# Patient Record
Sex: MALE | Race: WHITE | NOT HISPANIC OR LATINO | Employment: OTHER | URBAN - METROPOLITAN AREA
[De-identification: names, ages, dates, MRNs, and addresses within clinical notes are randomized per-mention and may not be internally consistent; named-entity substitution may affect disease eponyms.]

---

## 2023-05-01 DIAGNOSIS — N40.1 BENIGN PROSTATIC HYPERPLASIA WITH LOWER URINARY TRACT SYMPTOMS, SYMPTOM DETAILS UNSPECIFIED: Primary | ICD-10-CM

## 2023-05-01 RX ORDER — TERAZOSIN 5 MG/1
5 CAPSULE ORAL DAILY
COMMUNITY
End: 2023-05-01 | Stop reason: SDUPTHER

## 2023-05-01 RX ORDER — TERAZOSIN 5 MG/1
5 CAPSULE ORAL DAILY
Qty: 90 CAPSULE | Refills: 1 | Status: SHIPPED | OUTPATIENT
Start: 2023-05-01 | End: 2024-01-02

## 2023-05-04 DIAGNOSIS — E11.42 DIABETIC POLYNEUROPATHY ASSOCIATED WITH TYPE 2 DIABETES MELLITUS (MULTI): ICD-10-CM

## 2023-05-04 PROBLEM — R79.89 ABNORMAL TSH: Status: ACTIVE | Noted: 2023-05-04

## 2023-05-04 PROBLEM — E11.9 DIABETES MELLITUS TYPE 2, CONTROLLED (MULTI): Status: ACTIVE | Noted: 2023-05-04

## 2023-05-04 PROBLEM — M25.562 ARTHRALGIA OF LEFT KNEE: Status: ACTIVE | Noted: 2023-05-04

## 2023-05-04 PROBLEM — N52.9 ERECTILE DYSFUNCTION: Status: ACTIVE | Noted: 2023-05-04

## 2023-05-04 PROBLEM — M47.812 FACET ARTHRITIS OF CERVICAL REGION: Status: ACTIVE | Noted: 2023-05-04

## 2023-05-04 PROBLEM — I25.10 ARTERIOSCLEROTIC CARDIOVASCULAR DISEASE: Status: ACTIVE | Noted: 2023-05-04

## 2023-05-04 PROBLEM — M19.90 ARTHRITIS: Status: ACTIVE | Noted: 2023-05-04

## 2023-05-04 PROBLEM — N28.89 KIDNEY MASS: Status: ACTIVE | Noted: 2023-05-04

## 2023-05-04 PROBLEM — R97.20 HIGH PROSTATE SPECIFIC ANTIGEN (PSA): Status: ACTIVE | Noted: 2023-05-04

## 2023-05-04 PROBLEM — M54.9 BACK PAIN: Status: ACTIVE | Noted: 2023-05-04

## 2023-05-04 PROBLEM — D64.9 ANEMIA: Status: ACTIVE | Noted: 2023-05-04

## 2023-05-04 PROBLEM — N18.32 STAGE 3B CHRONIC KIDNEY DISEASE (MULTI): Status: ACTIVE | Noted: 2023-05-04

## 2023-05-04 PROBLEM — K57.90 DIVERTICULOSIS: Status: ACTIVE | Noted: 2023-05-04

## 2023-05-04 PROBLEM — I10 HYPERTENSION: Status: ACTIVE | Noted: 2023-05-04

## 2023-05-04 PROBLEM — N18.9 CHRONIC KIDNEY DISEASE: Status: ACTIVE | Noted: 2023-05-04

## 2023-05-04 PROBLEM — M47.816 LUMBAR SPONDYLOSIS: Status: ACTIVE | Noted: 2023-05-04

## 2023-05-04 PROBLEM — I45.10 COMPLETE RIGHT BUNDLE BRANCH BLOCK: Status: ACTIVE | Noted: 2023-05-04

## 2023-05-04 PROBLEM — R79.89 LOW VITAMIN D LEVEL: Status: ACTIVE | Noted: 2023-05-04

## 2023-05-04 PROBLEM — R93.89 ABNORMAL FINDING ON IMAGING: Status: ACTIVE | Noted: 2023-05-04

## 2023-05-04 PROBLEM — N40.0 BPH (BENIGN PROSTATIC HYPERPLASIA): Status: ACTIVE | Noted: 2023-05-04

## 2023-05-04 PROBLEM — M43.16 SPONDYLOLISTHESIS, LUMBAR REGION: Status: ACTIVE | Noted: 2023-05-04

## 2023-05-04 PROBLEM — E78.5 HYPERLIPIDEMIA: Status: ACTIVE | Noted: 2023-05-04

## 2023-05-04 PROBLEM — M47.816 FACET DEGENERATION OF LUMBAR REGION: Status: ACTIVE | Noted: 2023-05-04

## 2023-05-04 PROBLEM — R00.1 BRADYCARDIA, SINUS: Status: ACTIVE | Noted: 2023-05-04

## 2023-05-04 PROBLEM — M17.9 OSTEOARTHRITIS OF KNEE: Status: ACTIVE | Noted: 2023-05-04

## 2023-05-04 PROBLEM — I65.23 CALCIFICATION OF BOTH CAROTID ARTERIES: Status: ACTIVE | Noted: 2023-05-04

## 2023-05-04 PROBLEM — M50.90 CERVICAL DISC DISEASE: Status: ACTIVE | Noted: 2023-05-04

## 2023-05-04 PROBLEM — M48.062 SPINAL STENOSIS, LUMBAR REGION, WITH NEUROGENIC CLAUDICATION: Status: ACTIVE | Noted: 2023-05-04

## 2023-05-04 RX ORDER — GABAPENTIN 300 MG/1
300 CAPSULE ORAL 3 TIMES DAILY
Qty: 270 CAPSULE | Refills: 1 | Status: SHIPPED | OUTPATIENT
Start: 2023-05-04 | End: 2024-01-29

## 2023-05-04 RX ORDER — GABAPENTIN 300 MG/1
300 CAPSULE ORAL 3 TIMES DAILY
COMMUNITY
End: 2023-05-04 | Stop reason: SDUPTHER

## 2023-05-05 DIAGNOSIS — E11.42 DIABETIC POLYNEUROPATHY ASSOCIATED WITH TYPE 2 DIABETES MELLITUS (MULTI): ICD-10-CM

## 2023-05-06 RX ORDER — GABAPENTIN 300 MG/1
300 CAPSULE ORAL 3 TIMES DAILY
Qty: 270 CAPSULE | Refills: 1 | OUTPATIENT
Start: 2023-05-06

## 2023-05-09 RX ORDER — VIT C/E/ZN/COPPR/LUTEIN/ZEAXAN 250MG-90MG
CAPSULE ORAL
COMMUNITY

## 2023-05-09 RX ORDER — ASPIRIN 81 MG/1
1 TABLET ORAL DAILY
COMMUNITY

## 2023-05-09 RX ORDER — BLOOD SUGAR DIAGNOSTIC
1 STRIP MISCELLANEOUS DAILY
COMMUNITY
End: 2023-12-13 | Stop reason: WASHOUT

## 2023-05-09 RX ORDER — LOSARTAN POTASSIUM 25 MG/1
25 TABLET ORAL DAILY
COMMUNITY
End: 2023-05-16 | Stop reason: SDUPTHER

## 2023-05-09 RX ORDER — AMMONIUM LACTATE 12 G/100G
LOTION TOPICAL
COMMUNITY
Start: 2022-02-09 | End: 2023-09-12 | Stop reason: WASHOUT

## 2023-05-09 RX ORDER — SIMVASTATIN 40 MG/1
40 TABLET, FILM COATED ORAL DAILY
COMMUNITY
End: 2023-07-25

## 2023-05-09 RX ORDER — GLIPIZIDE 2.5 MG/1
TABLET, EXTENDED RELEASE ORAL
COMMUNITY
End: 2023-05-16 | Stop reason: ALTCHOICE

## 2023-05-16 ENCOUNTER — OFFICE VISIT (OUTPATIENT)
Dept: PRIMARY CARE | Facility: CLINIC | Age: 88
End: 2023-05-16
Payer: MEDICARE

## 2023-05-16 VITALS
DIASTOLIC BLOOD PRESSURE: 62 MMHG | WEIGHT: 179 LBS | RESPIRATION RATE: 21 BRPM | SYSTOLIC BLOOD PRESSURE: 150 MMHG | HEART RATE: 56 BPM | BODY MASS INDEX: 30.73 KG/M2

## 2023-05-16 DIAGNOSIS — N18.32 ANEMIA DUE TO STAGE 3B CHRONIC KIDNEY DISEASE (MULTI): ICD-10-CM

## 2023-05-16 DIAGNOSIS — M48.062 SPINAL STENOSIS, LUMBAR REGION, WITH NEUROGENIC CLAUDICATION: ICD-10-CM

## 2023-05-16 DIAGNOSIS — R79.89 LOW VITAMIN D LEVEL: ICD-10-CM

## 2023-05-16 DIAGNOSIS — N40.0 BENIGN PROSTATIC HYPERPLASIA WITHOUT LOWER URINARY TRACT SYMPTOMS: ICD-10-CM

## 2023-05-16 DIAGNOSIS — E11.42 DIABETIC POLYNEUROPATHY ASSOCIATED WITH TYPE 2 DIABETES MELLITUS (MULTI): ICD-10-CM

## 2023-05-16 DIAGNOSIS — N28.89 KIDNEY MASS: ICD-10-CM

## 2023-05-16 DIAGNOSIS — D63.1 ANEMIA DUE TO STAGE 3B CHRONIC KIDNEY DISEASE (MULTI): ICD-10-CM

## 2023-05-16 DIAGNOSIS — I10 PRIMARY HYPERTENSION: Primary | ICD-10-CM

## 2023-05-16 PROCEDURE — 1160F RVW MEDS BY RX/DR IN RCRD: CPT | Performed by: INTERNAL MEDICINE

## 2023-05-16 PROCEDURE — 1159F MED LIST DOCD IN RCRD: CPT | Performed by: INTERNAL MEDICINE

## 2023-05-16 PROCEDURE — 1036F TOBACCO NON-USER: CPT | Performed by: INTERNAL MEDICINE

## 2023-05-16 PROCEDURE — 3077F SYST BP >= 140 MM HG: CPT | Performed by: INTERNAL MEDICINE

## 2023-05-16 PROCEDURE — 1157F ADVNC CARE PLAN IN RCRD: CPT | Performed by: INTERNAL MEDICINE

## 2023-05-16 PROCEDURE — 99214 OFFICE O/P EST MOD 30 MIN: CPT | Performed by: INTERNAL MEDICINE

## 2023-05-16 PROCEDURE — 85027 COMPLETE CBC AUTOMATED: CPT

## 2023-05-16 PROCEDURE — 80053 COMPREHEN METABOLIC PANEL: CPT

## 2023-05-16 PROCEDURE — 3078F DIAST BP <80 MM HG: CPT | Performed by: INTERNAL MEDICINE

## 2023-05-16 PROCEDURE — 83036 HEMOGLOBIN GLYCOSYLATED A1C: CPT

## 2023-05-16 RX ORDER — LOSARTAN POTASSIUM 25 MG/1
50 TABLET ORAL DAILY
Qty: 180 TABLET | Refills: 1 | Status: SHIPPED | OUTPATIENT
Start: 2023-05-16 | End: 2023-09-12 | Stop reason: SDUPTHER

## 2023-05-16 ASSESSMENT — ENCOUNTER SYMPTOMS
SLEEP DISTURBANCE: 0
CHILLS: 0
CONFUSION: 0
CONSTIPATION: 0
LIGHT-HEADEDNESS: 1
BACK PAIN: 1
FATIGUE: 1
VOMITING: 0
DYSPHORIC MOOD: 0
CHEST TIGHTNESS: 0
ABDOMINAL PAIN: 0
NERVOUS/ANXIOUS: 0
FEVER: 0
WHEEZING: 0
DIZZINESS: 0
SHORTNESS OF BREATH: 0
DECREASED CONCENTRATION: 0
COUGH: 0
ACTIVITY CHANGE: 0
HEADACHES: 0
APPETITE CHANGE: 0
PALPITATIONS: 0
NAUSEA: 0
STRIDOR: 0
DIFFICULTY URINATING: 0
UNEXPECTED WEIGHT CHANGE: 0
DIARRHEA: 0

## 2023-05-16 NOTE — PROGRESS NOTES
Subjective   Patient ID: Nik Washington is a 88 y.o. male who presents for routine follow up.    HPI  Pt here in 3 month follow up.  He tells me in the last few months he has noticed his BP going up a bit.  He will get 145 as systolic reading.  He has low diastolic readings.  He is on Losartan 25 mg daily.      He last had a1c of 6.4 in July and 6.7% in 12/2022.  He is currently not on any DM meds.      He continues on statin therapy.      He sees nephrology regularly.  Has his appointment June.  He has history of stage 3 CKD with anemia.  He has history of masses on his kidney's that we are just watching.      Review of Systems   Constitutional:  Positive for fatigue. Negative for activity change, appetite change, chills, fever and unexpected weight change.   Respiratory:  Negative for cough, chest tightness, shortness of breath, wheezing and stridor.    Cardiovascular:  Negative for chest pain, palpitations and leg swelling.   Gastrointestinal:  Negative for abdominal pain, constipation, diarrhea, nausea and vomiting.   Genitourinary:  Negative for difficulty urinating.   Musculoskeletal:  Positive for back pain.   Neurological:  Positive for light-headedness. Negative for dizziness and headaches.   Psychiatric/Behavioral:  Negative for confusion, decreased concentration, dysphoric mood and sleep disturbance. The patient is not nervous/anxious.        Objective   /62   Pulse 56   Resp 21   Wt 81.2 kg (179 lb)   BMI 30.73 kg/m²    Physical Exam  Constitutional:       Appearance: Normal appearance.   Cardiovascular:      Rate and Rhythm: Normal rate and regular rhythm.      Pulses: Normal pulses.      Heart sounds: Normal heart sounds.   Pulmonary:      Effort: Pulmonary effort is normal.      Breath sounds: Normal breath sounds.   Abdominal:      General: Bowel sounds are normal.      Palpations: Abdomen is soft.   Musculoskeletal:      Right lower leg: Edema (1+ pitting edema mid way up shin  bilaterally) present.      Left lower leg: Edema present.   Neurological:      Mental Status: He is alert and oriented to person, place, and time.   Psychiatric:         Mood and Affect: Mood normal.         Assessment/Plan   Problem List Items Addressed This Visit          Nervous    Diabetic polyneuropathy associated with type 2 diabetes mellitus (CMS/HCC)     On Gabapentin  His A1C is <7% so we took him off Glipizide last year  Repeat levels today   Low sugar/carb diet          Relevant Orders    Hemoglobin A1C    Follow Up In Primary Care       Circulatory    Hypertension - Primary     Pt seeing higher readings at home  Will increase his dose to 25 mg twice a day and instructed pt to take 2nd dose only if his BP is high->140/90   Watch diet-low in salt          Relevant Medications    losartan (Cozaar) 25 mg tablet    Other Relevant Orders    Comprehensive Metabolic Panel    Follow Up In Primary Care       Genitourinary    BPH (benign prostatic hyperplasia)    Relevant Orders    Follow Up In Primary Care    Kidney mass     We are just watching this; likely cancer but with patient age and other conditions observation safest approach         Relevant Orders    Follow Up In Primary Care    Anemia due to stage 3b chronic kidney disease     Repeating labs today  This is due to CKD          Relevant Orders    CBC    Follow Up In Primary Care       Musculoskeletal    Spinal stenosis, lumbar region, with neurogenic claudication    Relevant Orders    Follow Up In Primary Care       Other    Low vitamin D level    Relevant Orders    Follow Up In Primary Care

## 2023-05-16 NOTE — ASSESSMENT & PLAN NOTE
Pt seeing higher readings at home  Will increase his dose to 25 mg twice a day and instructed pt to take 2nd dose only if his BP is high->140/90   Watch diet-low in salt

## 2023-05-16 NOTE — PATIENT INSTRUCTIONS
Increase dose of Losartan which is your blood pressure medication to 50 mg daily (take 2 of your 25 mg tablets)   You can either separate the 2 doses by taking 1 in AM and 1 in PM (if your BP in the late afternoon is not high you do not need to take second dose)   Continue home BP checks with goal of <140/90 consistently  Continue to see specialist as directed  We did blood work today and will call with results  Follow up in Sept for full physical

## 2023-05-16 NOTE — ASSESSMENT & PLAN NOTE
We are just watching this; likely cancer but with patient age and other conditions observation safest approach

## 2023-05-16 NOTE — ASSESSMENT & PLAN NOTE
On Gabapentin  His A1C is <7% so we took him off Glipizide last year  Repeat levels today   Low sugar/carb diet

## 2023-05-17 LAB
ALANINE AMINOTRANSFERASE (SGPT) (U/L) IN SER/PLAS: 9 U/L (ref 10–52)
ALBUMIN (G/DL) IN SER/PLAS: 4 G/DL (ref 3.4–5)
ALKALINE PHOSPHATASE (U/L) IN SER/PLAS: 38 U/L (ref 33–136)
ANION GAP IN SER/PLAS: 10 MMOL/L (ref 10–20)
ASPARTATE AMINOTRANSFERASE (SGOT) (U/L) IN SER/PLAS: 13 U/L (ref 9–39)
BILIRUBIN TOTAL (MG/DL) IN SER/PLAS: 0.4 MG/DL (ref 0–1.2)
CALCIUM (MG/DL) IN SER/PLAS: 9 MG/DL (ref 8.6–10.6)
CARBON DIOXIDE, TOTAL (MMOL/L) IN SER/PLAS: 26 MMOL/L (ref 21–32)
CHLORIDE (MMOL/L) IN SER/PLAS: 109 MMOL/L (ref 98–107)
CREATININE (MG/DL) IN SER/PLAS: 1.9 MG/DL (ref 0.5–1.3)
ERYTHROCYTE DISTRIBUTION WIDTH (RATIO) BY AUTOMATED COUNT: 13.3 % (ref 11.5–14.5)
ERYTHROCYTE MEAN CORPUSCULAR HEMOGLOBIN CONCENTRATION (G/DL) BY AUTOMATED: 31.1 G/DL (ref 32–36)
ERYTHROCYTE MEAN CORPUSCULAR VOLUME (FL) BY AUTOMATED COUNT: 101 FL (ref 80–100)
ERYTHROCYTES (10*6/UL) IN BLOOD BY AUTOMATED COUNT: 3.39 X10E12/L (ref 4.5–5.9)
ESTIMATED AVERAGE GLUCOSE FOR HBA1C: 148 MG/DL
GFR MALE: 33 ML/MIN/1.73M2
GLUCOSE (MG/DL) IN SER/PLAS: 132 MG/DL (ref 74–99)
HEMATOCRIT (%) IN BLOOD BY AUTOMATED COUNT: 34.4 % (ref 41–52)
HEMOGLOBIN (G/DL) IN BLOOD: 10.7 G/DL (ref 13.5–17.5)
HEMOGLOBIN A1C/HEMOGLOBIN TOTAL IN BLOOD: 6.8 %
LEUKOCYTES (10*3/UL) IN BLOOD BY AUTOMATED COUNT: 5.4 X10E9/L (ref 4.4–11.3)
NRBC (PER 100 WBCS) BY AUTOMATED COUNT: 0 /100 WBC (ref 0–0)
PLATELETS (10*3/UL) IN BLOOD AUTOMATED COUNT: 167 X10E9/L (ref 150–450)
POTASSIUM (MMOL/L) IN SER/PLAS: 5.2 MMOL/L (ref 3.5–5.3)
PROTEIN TOTAL: 6.3 G/DL (ref 6.4–8.2)
SODIUM (MMOL/L) IN SER/PLAS: 140 MMOL/L (ref 136–145)
UREA NITROGEN (MG/DL) IN SER/PLAS: 35 MG/DL (ref 6–23)

## 2023-05-19 ENCOUNTER — TELEPHONE (OUTPATIENT)
Dept: PRIMARY CARE | Facility: CLINIC | Age: 88
End: 2023-05-19
Payer: MEDICARE

## 2023-05-19 NOTE — TELEPHONE ENCOUNTER
----- Message from Krystyna MARIANO DO sent at 5/17/2023  8:24 AM EDT -----  Kidney and anemia both stable but reduced from previous-he sees nephrology so will follow up with them as directed  His a1c is stable off medications at 6.8% which is to goal for his age; just continue monitoring for good diet

## 2023-05-19 NOTE — TELEPHONE ENCOUNTER
----- Message from Krystyna MARIANO DO sent at 5/17/2023  8:24 AM EDT -----  Kidney and anemia both stable but reduced from previous-he sees nephrology so will follow up with them as directed  His a1c is stable off medications at 6.8% which is to goal for his age; just continue monitoring for good diet           Patient advised per Dr Shultz. SS

## 2023-05-22 LAB
ALBUMIN (G/DL) IN SER/PLAS: 4.3 G/DL (ref 3.4–5)
ALBUMIN (MG/L) IN URINE: 48.5 MG/L
ALBUMIN/CREATININE (UG/MG) IN URINE: 70.9 UG/MG CRT (ref 0–30)
ANION GAP IN SER/PLAS: 12 MMOL/L (ref 10–20)
CALCIDIOL (25 OH VITAMIN D3) (NG/ML) IN SER/PLAS: 41 NG/ML
CALCIUM (MG/DL) IN SER/PLAS: 9.1 MG/DL (ref 8.6–10.6)
CARBON DIOXIDE, TOTAL (MMOL/L) IN SER/PLAS: 26 MMOL/L (ref 21–32)
CHLORIDE (MMOL/L) IN SER/PLAS: 107 MMOL/L (ref 98–107)
CREATININE (MG/DL) IN SER/PLAS: 2.23 MG/DL (ref 0.5–1.3)
CREATININE (MG/DL) IN URINE: 68.4 MG/DL (ref 20–370)
ERYTHROCYTE DISTRIBUTION WIDTH (RATIO) BY AUTOMATED COUNT: 13.1 % (ref 11.5–14.5)
ERYTHROCYTE MEAN CORPUSCULAR HEMOGLOBIN CONCENTRATION (G/DL) BY AUTOMATED: 32 G/DL (ref 32–36)
ERYTHROCYTE MEAN CORPUSCULAR VOLUME (FL) BY AUTOMATED COUNT: 100 FL (ref 80–100)
ERYTHROCYTES (10*6/UL) IN BLOOD BY AUTOMATED COUNT: 3.39 X10E12/L (ref 4.5–5.9)
GFR MALE: 28 ML/MIN/1.73M2
GLUCOSE (MG/DL) IN SER/PLAS: 112 MG/DL (ref 74–99)
HEMATOCRIT (%) IN BLOOD BY AUTOMATED COUNT: 33.8 % (ref 41–52)
HEMOGLOBIN (G/DL) IN BLOOD: 10.8 G/DL (ref 13.5–17.5)
LEUKOCYTES (10*3/UL) IN BLOOD BY AUTOMATED COUNT: 6 X10E9/L (ref 4.4–11.3)
NRBC (PER 100 WBCS) BY AUTOMATED COUNT: 0 /100 WBC (ref 0–0)
PARATHYRIN INTACT (PG/ML) IN SER/PLAS: 54.5 PG/ML (ref 18.5–88)
PHOSPHATE (MG/DL) IN SER/PLAS: 4.3 MG/DL (ref 2.5–4.9)
PLATELETS (10*3/UL) IN BLOOD AUTOMATED COUNT: 171 X10E9/L (ref 150–450)
POTASSIUM (MMOL/L) IN SER/PLAS: 5.6 MMOL/L (ref 3.5–5.3)
SODIUM (MMOL/L) IN SER/PLAS: 139 MMOL/L (ref 136–145)
UREA NITROGEN (MG/DL) IN SER/PLAS: 35 MG/DL (ref 6–23)

## 2023-07-25 DIAGNOSIS — E78.5 HYPERLIPIDEMIA, UNSPECIFIED HYPERLIPIDEMIA TYPE: ICD-10-CM

## 2023-07-25 RX ORDER — SIMVASTATIN 40 MG/1
40 TABLET, FILM COATED ORAL DAILY
Qty: 90 TABLET | Refills: 1 | Status: SHIPPED | OUTPATIENT
Start: 2023-07-25 | End: 2024-01-25

## 2023-07-27 LAB
ANION GAP IN SER/PLAS: 11 MMOL/L (ref 10–20)
CALCIUM (MG/DL) IN SER/PLAS: 8.8 MG/DL (ref 8.6–10.6)
CARBON DIOXIDE, TOTAL (MMOL/L) IN SER/PLAS: 24 MMOL/L (ref 21–32)
CHLORIDE (MMOL/L) IN SER/PLAS: 108 MMOL/L (ref 98–107)
CREATININE (MG/DL) IN SER/PLAS: 2.08 MG/DL (ref 0.5–1.3)
GFR MALE: 30 ML/MIN/1.73M2
GLUCOSE (MG/DL) IN SER/PLAS: 145 MG/DL (ref 74–99)
POTASSIUM (MMOL/L) IN SER/PLAS: 5.5 MMOL/L (ref 3.5–5.3)
SODIUM (MMOL/L) IN SER/PLAS: 137 MMOL/L (ref 136–145)
UREA NITROGEN (MG/DL) IN SER/PLAS: 33 MG/DL (ref 6–23)

## 2023-09-12 ENCOUNTER — OFFICE VISIT (OUTPATIENT)
Dept: PRIMARY CARE | Facility: CLINIC | Age: 88
End: 2023-09-12
Payer: MEDICARE

## 2023-09-12 VITALS
HEART RATE: 60 BPM | RESPIRATION RATE: 18 BRPM | BODY MASS INDEX: 31.88 KG/M2 | SYSTOLIC BLOOD PRESSURE: 168 MMHG | WEIGHT: 179.9 LBS | HEIGHT: 63 IN | DIASTOLIC BLOOD PRESSURE: 70 MMHG

## 2023-09-12 DIAGNOSIS — Z00.00 MEDICARE ANNUAL WELLNESS VISIT, SUBSEQUENT: Primary | ICD-10-CM

## 2023-09-12 DIAGNOSIS — E11.42 DIABETIC POLYNEUROPATHY ASSOCIATED WITH TYPE 2 DIABETES MELLITUS (MULTI): ICD-10-CM

## 2023-09-12 DIAGNOSIS — N25.81 SECONDARY HYPERPARATHYROIDISM OF RENAL ORIGIN (MULTI): ICD-10-CM

## 2023-09-12 DIAGNOSIS — N18.32 STAGE 3B CHRONIC KIDNEY DISEASE (MULTI): ICD-10-CM

## 2023-09-12 DIAGNOSIS — E78.2 MIXED HYPERLIPIDEMIA: ICD-10-CM

## 2023-09-12 DIAGNOSIS — Z23 NEED FOR INFLUENZA VACCINATION: ICD-10-CM

## 2023-09-12 DIAGNOSIS — N18.32 ANEMIA DUE TO STAGE 3B CHRONIC KIDNEY DISEASE (MULTI): ICD-10-CM

## 2023-09-12 DIAGNOSIS — E11.9 DIABETES MELLITUS WITHOUT COMPLICATION (MULTI): ICD-10-CM

## 2023-09-12 DIAGNOSIS — I10 PRIMARY HYPERTENSION: ICD-10-CM

## 2023-09-12 DIAGNOSIS — N28.89 KIDNEY MASS: ICD-10-CM

## 2023-09-12 DIAGNOSIS — M48.062 SPINAL STENOSIS, LUMBAR REGION, WITH NEUROGENIC CLAUDICATION: ICD-10-CM

## 2023-09-12 DIAGNOSIS — R79.89 LOW VITAMIN D LEVEL: ICD-10-CM

## 2023-09-12 DIAGNOSIS — Z00.00 ROUTINE GENERAL MEDICAL EXAMINATION AT HEALTH CARE FACILITY: ICD-10-CM

## 2023-09-12 DIAGNOSIS — N40.0 BENIGN PROSTATIC HYPERPLASIA WITHOUT LOWER URINARY TRACT SYMPTOMS: ICD-10-CM

## 2023-09-12 DIAGNOSIS — D63.1 ANEMIA DUE TO STAGE 3B CHRONIC KIDNEY DISEASE (MULTI): ICD-10-CM

## 2023-09-12 DIAGNOSIS — I25.10 ARTERIOSCLEROTIC CARDIOVASCULAR DISEASE: ICD-10-CM

## 2023-09-12 PROCEDURE — 1170F FXNL STATUS ASSESSED: CPT | Performed by: INTERNAL MEDICINE

## 2023-09-12 PROCEDURE — G0439 PPPS, SUBSEQ VISIT: HCPCS | Performed by: INTERNAL MEDICINE

## 2023-09-12 PROCEDURE — 1036F TOBACCO NON-USER: CPT | Performed by: INTERNAL MEDICINE

## 2023-09-12 PROCEDURE — 81003 URINALYSIS AUTO W/O SCOPE: CPT

## 2023-09-12 PROCEDURE — 82570 ASSAY OF URINE CREATININE: CPT

## 2023-09-12 PROCEDURE — 80053 COMPREHEN METABOLIC PANEL: CPT

## 2023-09-12 PROCEDURE — 83036 HEMOGLOBIN GLYCOSYLATED A1C: CPT

## 2023-09-12 PROCEDURE — 82043 UR ALBUMIN QUANTITATIVE: CPT

## 2023-09-12 PROCEDURE — 80061 LIPID PANEL: CPT

## 2023-09-12 PROCEDURE — 3077F SYST BP >= 140 MM HG: CPT | Performed by: INTERNAL MEDICINE

## 2023-09-12 PROCEDURE — 90662 IIV NO PRSV INCREASED AG IM: CPT | Performed by: INTERNAL MEDICINE

## 2023-09-12 PROCEDURE — 1160F RVW MEDS BY RX/DR IN RCRD: CPT | Performed by: INTERNAL MEDICINE

## 2023-09-12 PROCEDURE — 3078F DIAST BP <80 MM HG: CPT | Performed by: INTERNAL MEDICINE

## 2023-09-12 PROCEDURE — 1159F MED LIST DOCD IN RCRD: CPT | Performed by: INTERNAL MEDICINE

## 2023-09-12 PROCEDURE — 85027 COMPLETE CBC AUTOMATED: CPT

## 2023-09-12 PROCEDURE — 99214 OFFICE O/P EST MOD 30 MIN: CPT | Performed by: INTERNAL MEDICINE

## 2023-09-12 PROCEDURE — 1157F ADVNC CARE PLAN IN RCRD: CPT | Performed by: INTERNAL MEDICINE

## 2023-09-12 PROCEDURE — G0008 ADMIN INFLUENZA VIRUS VAC: HCPCS | Performed by: INTERNAL MEDICINE

## 2023-09-12 RX ORDER — ACETAMINOPHEN 500 MG/1
1000 CAPSULE, LIQUID FILLED ORAL EVERY 6 HOURS PRN
COMMUNITY

## 2023-09-12 RX ORDER — AMLODIPINE BESYLATE 5 MG/1
5 TABLET ORAL DAILY
COMMUNITY
Start: 2023-08-31

## 2023-09-12 RX ORDER — LOSARTAN POTASSIUM 25 MG/1
25 TABLET ORAL DAILY
Qty: 180 TABLET | Refills: 1
Start: 2023-09-12 | End: 2023-12-13 | Stop reason: WASHOUT

## 2023-09-12 ASSESSMENT — ENCOUNTER SYMPTOMS
VOMITING: 0
SLEEP DISTURBANCE: 0
NAUSEA: 0
COUGH: 0
ABDOMINAL PAIN: 0
LIGHT-HEADEDNESS: 0
VOICE CHANGE: 0
NUMBNESS: 0
SINUS PAIN: 0
DIZZINESS: 0
TREMORS: 0
BLOOD IN STOOL: 0
DIARRHEA: 0
DECREASED CONCENTRATION: 0
FACIAL ASYMMETRY: 0
PALPITATIONS: 0
TROUBLE SWALLOWING: 0
WHEEZING: 0
APNEA: 0
EYE ITCHING: 0
DIAPHORESIS: 0
SHORTNESS OF BREATH: 0
EYE DISCHARGE: 0
DIFFICULTY URINATING: 0
POLYPHAGIA: 0
WEAKNESS: 0
FATIGUE: 0
SEIZURES: 0
CHOKING: 0
NECK PAIN: 0
HALLUCINATIONS: 0
CONSTIPATION: 0
POLYDIPSIA: 0
HEMATURIA: 0
UNEXPECTED WEIGHT CHANGE: 0
CHEST TIGHTNESS: 0
ARTHRALGIAS: 0
PHOTOPHOBIA: 0
JOINT SWELLING: 0
ADENOPATHY: 0
HYPERACTIVE: 0
COLOR CHANGE: 0
FEVER: 0
CONFUSION: 0
NECK STIFFNESS: 0
BRUISES/BLEEDS EASILY: 1
FREQUENCY: 0
FACIAL SWELLING: 0
SINUS PRESSURE: 0
EYE PAIN: 0
EYE REDNESS: 0
SORE THROAT: 0
ANAL BLEEDING: 0
RECTAL PAIN: 0
BACK PAIN: 1
STRIDOR: 0
RHINORRHEA: 0
CHILLS: 0
APPETITE CHANGE: 0
DYSURIA: 0
ABDOMINAL DISTENTION: 0
HEADACHES: 0
NERVOUS/ANXIOUS: 0
DYSPHORIC MOOD: 0
ACTIVITY CHANGE: 0
FLANK PAIN: 0
MYALGIAS: 0
WOUND: 0
AGITATION: 0
SPEECH DIFFICULTY: 0

## 2023-09-12 ASSESSMENT — ACTIVITIES OF DAILY LIVING (ADL)
MANAGING_FINANCES: INDEPENDENT
DOING_HOUSEWORK: INDEPENDENT
BATHING: INDEPENDENT
GROCERY_SHOPPING: INDEPENDENT
DRESSING: INDEPENDENT
TAKING_MEDICATION: INDEPENDENT

## 2023-09-12 ASSESSMENT — PATIENT HEALTH QUESTIONNAIRE - PHQ9
SUM OF ALL RESPONSES TO PHQ9 QUESTIONS 1 AND 2: 0
2. FEELING DOWN, DEPRESSED OR HOPELESS: NOT AT ALL
1. LITTLE INTEREST OR PLEASURE IN DOING THINGS: NOT AT ALL

## 2023-09-12 NOTE — ASSESSMENT & PLAN NOTE
He was referred to new pain management doc-he will call to arrange  He is using Gabapentin and ES Tylenol TID

## 2023-09-12 NOTE — PROGRESS NOTES
Subjective   Reason for Visit: Nik Washnigton is an 88 y.o. male here for a Medicare Wellness visit.     Past Medical, Surgical, and Family History reviewed and updated in chart.    Reviewed all medications by prescribing practitioner or clinical pharmacist (such as prescriptions, OTCs, herbal therapies and supplements) and documented in the medical record.    HPI    Pt here for MWV.  He is trying to stay active and he watches his diet due to having CKD.  His nephrologist wants him to lower his potassium foods (bananas).  He is up 6 lbs since last year.      He is having more low back pain.  He is trying to do exercises and stretch but some days are really bad.  He is using Gabapentin.     He had an addition of Amlodipine to 2.5 mg and Losartan 25 mg for BP.  His BP is high today.   He takes his BP at home and gets 140/60.  He has had some lows of 120.      He sees Dr. Moreno (urology) for the right renal mass.  He is just watching this.  He will return in 1 year with repeat imaging.  He just had CT that showed stability of bilateral renal masses without change and no evidence of metastasis noted.      Patient Care Team:  Krystyna MARIANO DO as PCP - General  Krystyna MARIANO DO as PCP - [facility] Attributed Provider     Review of Systems   Constitutional:  Negative for activity change, appetite change, chills, diaphoresis, fatigue, fever and unexpected weight change.   HENT:  Positive for hearing loss. Negative for congestion, dental problem, drooling, ear discharge, ear pain, facial swelling, mouth sores, nosebleeds, postnasal drip, rhinorrhea, sinus pressure, sinus pain, sneezing, sore throat, tinnitus, trouble swallowing and voice change.    Eyes:  Positive for visual disturbance (wears glasses-up to date on exam). Negative for photophobia, pain, discharge, redness and itching.   Respiratory:  Negative for apnea, cough, choking, chest tightness, shortness of breath, wheezing and stridor.    Cardiovascular:  Negative  "for chest pain, palpitations and leg swelling.   Gastrointestinal:  Negative for abdominal distention, abdominal pain, anal bleeding, blood in stool, constipation, diarrhea, nausea, rectal pain and vomiting.   Endocrine: Negative for cold intolerance, heat intolerance, polydipsia, polyphagia and polyuria.   Genitourinary:  Negative for decreased urine volume, difficulty urinating, dysuria, enuresis, flank pain, frequency, genital sores, hematuria, penile discharge, penile pain, penile swelling, scrotal swelling, testicular pain and urgency.   Musculoskeletal:  Positive for back pain. Negative for arthralgias, gait problem, joint swelling, myalgias, neck pain and neck stiffness.   Skin:  Negative for color change, pallor, rash and wound.   Allergic/Immunologic: Negative for environmental allergies, food allergies and immunocompromised state.   Neurological:  Negative for dizziness, tremors, seizures, syncope, facial asymmetry, speech difficulty, weakness, light-headedness, numbness and headaches.   Hematological:  Negative for adenopathy. Bruises/bleeds easily.   Psychiatric/Behavioral:  Negative for agitation, behavioral problems, confusion, decreased concentration, dysphoric mood, hallucinations, self-injury, sleep disturbance and suicidal ideas. The patient is not nervous/anxious and is not hyperactive.        Objective   Vitals:  /70 (BP Location: Left arm, Patient Position: Sitting)   Pulse 60   Resp 18   Ht 1.607 m (5' 3.25\")   Wt 81.6 kg (179 lb 14.4 oz)   BMI 31.62 kg/m²       Physical Exam  Constitutional:       Appearance: Normal appearance. He is obese.   HENT:      Head: Normocephalic and atraumatic.      Right Ear: Tympanic membrane, ear canal and external ear normal. There is no impacted cerumen.      Left Ear: Tympanic membrane, ear canal and external ear normal. There is no impacted cerumen.      Nose: Nose normal. No congestion or rhinorrhea.      Mouth/Throat:      Mouth: Mucous membranes " are moist.      Pharynx: Oropharynx is clear. No oropharyngeal exudate or posterior oropharyngeal erythema.   Eyes:      Extraocular Movements: Extraocular movements intact.      Conjunctiva/sclera: Conjunctivae normal.      Pupils: Pupils are equal, round, and reactive to light.   Neck:      Vascular: No carotid bruit.   Cardiovascular:      Rate and Rhythm: Normal rate and regular rhythm.      Pulses: Normal pulses.      Heart sounds: Normal heart sounds. No murmur heard.  Pulmonary:      Effort: Pulmonary effort is normal. No respiratory distress.      Breath sounds: Normal breath sounds. No wheezing, rhonchi or rales.   Abdominal:      General: Abdomen is flat. Bowel sounds are normal. There is no distension.      Palpations: Abdomen is soft.      Tenderness: There is no abdominal tenderness.      Hernia: No hernia is present.   Musculoskeletal:         General: Tenderness (lumbar spine pain noted when laying supine and sitting upright) present. No swelling, deformity or signs of injury. Normal range of motion.      Cervical back: Normal range of motion and neck supple.      Right lower leg: Edema present.      Left lower leg: Edema present.   Lymphadenopathy:      Cervical: No cervical adenopathy.   Skin:     General: Skin is warm and dry.      Findings: No lesion or rash.   Neurological:      General: No focal deficit present.      Mental Status: He is alert and oriented to person, place, and time.      Cranial Nerves: No cranial nerve deficit.      Sensory: No sensory deficit.      Motor: No weakness.   Psychiatric:         Mood and Affect: Mood normal.         Behavior: Behavior normal.         Thought Content: Thought content normal.         Judgment: Judgment normal.         Assessment/Plan   Problem List Items Addressed This Visit       Arteriosclerotic cardiovascular disease    Current Assessment & Plan     Follows with cardiology  On statin and baby aspirin          BPH (benign prostatic hyperplasia)     Current Assessment & Plan     Sees urology          Chronic kidney disease    Current Assessment & Plan     Follows with nephrology  On low potassium diet          Diabetic polyneuropathy associated with type 2 diabetes mellitus (CMS/HCC)    Current Assessment & Plan     On gabapentin          Hyperlipidemia    Relevant Orders    Lipid Panel    Follow Up In Primary Care - Established    Hypertension    Current Assessment & Plan     Nephrology decrease Losartan dose to 25 mg/day and starting low dose Amlodipine 2.5 mg  BP continues to remain high; will defer to their management and have asked pt to talk to them regarding higher readings          Relevant Medications    losartan (Cozaar) 25 mg tablet    Other Relevant Orders    Comprehensive Metabolic Panel    Follow Up In Primary Care - Established    Kidney mass    Current Assessment & Plan     Sees urology who does CT scans yearly to monitor  Stable noted bilateral masses as of 8/2023          Low vitamin D level    Spinal stenosis, lumbar region, with neurogenic claudication    Current Assessment & Plan     He was referred to new pain management doc-he will call to arrange  He is using Gabapentin and ES Tylenol TID          Relevant Orders    Follow Up In Primary Care - Established    Anemia due to stage 3b chronic kidney disease (CMS/[facility])    Relevant Orders    Comprehensive Metabolic Panel    CBC    Follow Up In Primary Care - Established    Secondary hyperparathyroidism of renal origin (CMS/[facility])    Current Assessment & Plan     Sees nephrology regularly  Last visit was late June and will follow up in 2 months         Diabetes mellitus without complication (CMS/[facility])    Current Assessment & Plan     A1C of 6.8% back in May  Off all diabetic meds at this time due to CKD          Relevant Orders    Hemoglobin A1C    Urinalysis with Reflex Microscopic    Albumin , Urine Random    Follow Up In Primary Care - Established     Other Visit Diagnoses       Medicare annual  wellness visit, subsequent    -  Primary    Need for influenza vaccination        Relevant Orders    Flu vaccine, quadrivalent, high-dose, preservative free, age 65y+ (FLUZONE) (Completed)    Routine general medical examination at health care facility

## 2023-09-12 NOTE — ASSESSMENT & PLAN NOTE
Nephrology decrease Losartan dose to 25 mg/day and starting low dose Amlodipine 2.5 mg  BP continues to remain high; will defer to their management and have asked pt to talk to them regarding higher readings

## 2023-09-12 NOTE — PATIENT INSTRUCTIONS
Continue medications as directed-call when refills needed  Continue to see specialist as directed  Look into new pain management-per Dr. Hutchinson's note he referred you to somewhere in [place] ; continue gabapentin and Tylenol ES 1000 mg three times a day for now  You got your flu shot today; in next few weeks get newest Covid booster  Continue to adhere to healthy diet and be as active as able  We did blood work and urine and will call with results if abnormal  Follow up here in 3 months

## 2023-09-13 LAB
ALANINE AMINOTRANSFERASE (SGPT) (U/L) IN SER/PLAS: 8 U/L (ref 10–52)
ALBUMIN (G/DL) IN SER/PLAS: 4.1 G/DL (ref 3.4–5)
ALBUMIN (MG/L) IN URINE: 82.9 MG/L
ALBUMIN/CREATININE (UG/MG) IN URINE: 118.9 UG/MG CRT (ref 0–30)
ALKALINE PHOSPHATASE (U/L) IN SER/PLAS: 35 U/L (ref 33–136)
ANION GAP IN SER/PLAS: 15 MMOL/L (ref 10–20)
APPEARANCE, URINE: CLEAR
ASPARTATE AMINOTRANSFERASE (SGOT) (U/L) IN SER/PLAS: 13 U/L (ref 9–39)
BILIRUBIN TOTAL (MG/DL) IN SER/PLAS: 0.6 MG/DL (ref 0–1.2)
BILIRUBIN, URINE: NEGATIVE
BLOOD, URINE: NEGATIVE
CALCIUM (MG/DL) IN SER/PLAS: 8.8 MG/DL (ref 8.6–10.6)
CARBON DIOXIDE, TOTAL (MMOL/L) IN SER/PLAS: 21 MMOL/L (ref 21–32)
CHLORIDE (MMOL/L) IN SER/PLAS: 110 MMOL/L (ref 98–107)
CHOLESTEROL (MG/DL) IN SER/PLAS: 129 MG/DL (ref 0–199)
CHOLESTEROL IN HDL (MG/DL) IN SER/PLAS: 40.7 MG/DL
CHOLESTEROL/HDL RATIO: 3.2
COLOR, URINE: YELLOW
CREATININE (MG/DL) IN SER/PLAS: 1.87 MG/DL (ref 0.5–1.3)
CREATININE (MG/DL) IN URINE: 69.7 MG/DL (ref 20–370)
ERYTHROCYTE DISTRIBUTION WIDTH (RATIO) BY AUTOMATED COUNT: 12.8 % (ref 11.5–14.5)
ERYTHROCYTE MEAN CORPUSCULAR HEMOGLOBIN CONCENTRATION (G/DL) BY AUTOMATED: 31.6 G/DL (ref 32–36)
ERYTHROCYTE MEAN CORPUSCULAR VOLUME (FL) BY AUTOMATED COUNT: 101 FL (ref 80–100)
ERYTHROCYTES (10*6/UL) IN BLOOD BY AUTOMATED COUNT: 3.3 X10E12/L (ref 4.5–5.9)
ESTIMATED AVERAGE GLUCOSE FOR HBA1C: 146 MG/DL
GFR MALE: 34 ML/MIN/1.73M2
GLUCOSE (MG/DL) IN SER/PLAS: 102 MG/DL (ref 74–99)
GLUCOSE, URINE: NEGATIVE MG/DL
HEMATOCRIT (%) IN BLOOD BY AUTOMATED COUNT: 33.2 % (ref 41–52)
HEMOGLOBIN (G/DL) IN BLOOD: 10.5 G/DL (ref 13.5–17.5)
HEMOGLOBIN A1C/HEMOGLOBIN TOTAL IN BLOOD: 6.7 %
KETONES, URINE: NEGATIVE MG/DL
LDL: 66 MG/DL (ref 0–99)
LEUKOCYTE ESTERASE, URINE: NEGATIVE
LEUKOCYTES (10*3/UL) IN BLOOD BY AUTOMATED COUNT: 5.4 X10E9/L (ref 4.4–11.3)
NITRITE, URINE: NEGATIVE
NRBC (PER 100 WBCS) BY AUTOMATED COUNT: 0 /100 WBC (ref 0–0)
PH, URINE: 5 (ref 5–8)
PLATELETS (10*3/UL) IN BLOOD AUTOMATED COUNT: 161 X10E9/L (ref 150–450)
POTASSIUM (MMOL/L) IN SER/PLAS: 4.6 MMOL/L (ref 3.5–5.3)
PROTEIN TOTAL: 6.6 G/DL (ref 6.4–8.2)
PROTEIN, URINE: NEGATIVE MG/DL
SODIUM (MMOL/L) IN SER/PLAS: 141 MMOL/L (ref 136–145)
SPECIFIC GRAVITY, URINE: 1.02 (ref 1–1.03)
TRIGLYCERIDE (MG/DL) IN SER/PLAS: 111 MG/DL (ref 0–149)
UREA NITROGEN (MG/DL) IN SER/PLAS: 38 MG/DL (ref 6–23)
UROBILINOGEN, URINE: <2 MG/DL (ref 0–1.9)
VLDL: 22 MG/DL (ref 0–40)

## 2023-09-14 ENCOUNTER — TELEPHONE (OUTPATIENT)
Dept: PRIMARY CARE | Facility: CLINIC | Age: 88
End: 2023-09-14
Payer: MEDICARE

## 2023-09-14 NOTE — TELEPHONE ENCOUNTER
Let pt know all labs are stable; kidney function is stable and a bit improved; blood counts stable

## 2023-10-26 PROBLEM — M54.9 BACK PAIN: Status: ACTIVE | Noted: 2023-10-26

## 2023-10-26 PROBLEM — R00.1 BRADYCARDIA: Status: ACTIVE | Noted: 2023-10-26

## 2023-10-26 RX ORDER — DICLOFENAC SODIUM 10 MG/G
GEL TOPICAL
COMMUNITY

## 2023-10-26 RX ORDER — AMMONIUM LACTATE 12 G/100G
CREAM TOPICAL AS NEEDED
COMMUNITY
End: 2024-03-15 | Stop reason: WASHOUT

## 2023-10-27 ENCOUNTER — OFFICE VISIT (OUTPATIENT)
Dept: PODIATRY | Facility: CLINIC | Age: 88
End: 2023-10-27
Payer: MEDICARE

## 2023-10-27 DIAGNOSIS — B35.1 TINEA UNGUIUM: Primary | ICD-10-CM

## 2023-10-27 DIAGNOSIS — E11.9 DIABETES MELLITUS WITHOUT COMPLICATION (MULTI): ICD-10-CM

## 2023-10-27 DIAGNOSIS — M79.674 TOE PAIN, RIGHT: ICD-10-CM

## 2023-10-27 DIAGNOSIS — M79.675 TOE PAIN, LEFT: ICD-10-CM

## 2023-10-27 PROCEDURE — 1036F TOBACCO NON-USER: CPT | Performed by: PODIATRIST

## 2023-10-27 PROCEDURE — 1160F RVW MEDS BY RX/DR IN RCRD: CPT | Performed by: PODIATRIST

## 2023-10-27 PROCEDURE — 11721 DEBRIDE NAIL 6 OR MORE: CPT | Performed by: PODIATRIST

## 2023-10-27 PROCEDURE — 1159F MED LIST DOCD IN RCRD: CPT | Performed by: PODIATRIST

## 2023-10-27 NOTE — PROGRESS NOTES
CC:  painful thickened and elongated toenails and diabetic care.     HPI: Pt presents for dm foot exam and painful thickened and elongated toenails that are difficult to manage.  Onset was gradual with worsening course until recently.  Aggravated by shoe gear and ambulation.       PCP: Dr. olmstead  Last visit: 9-1-23     PMH  Past Medical History:   Diagnosis Date    Elevated prostate specific antigen (PSA) 02/23/2021    High prostate specific antigen (PSA)    Encounter for general adult medical examination without abnormal findings 09/29/2021    Medicare annual wellness visit, subsequent    Other conditions influencing health status 05/26/2021    Diabetes mellitus type 2, uncontrolled    Other specified symptoms and signs involving the circulatory and respiratory systems     Abdominal bruit    Personal history of other diseases of the nervous system and sense organs 12/01/2017    History of carpal tunnel syndrome    Personal history of other drug therapy 09/29/2021    History of influenza vaccination    Personal history of other endocrine, nutritional and metabolic disease     History of hyperkalemia    Personal history of other malignant neoplasm of skin     History of malignant neoplasm of skin     MEDS    Current Outpatient Medications:     Accu-Chek Guide test strips strip, 1 strip by subdermal route once daily., Disp: , Rfl:     acetaminophen (Tylenol) 500 mg capsule, Take 2 capsules (1,000 mg) by mouth every 6 hours if needed., Disp: , Rfl:     amLODIPine (Norvasc) 2.5 mg tablet, Take 1 tablet (2.5 mg) by mouth once daily., Disp: , Rfl:     ammonium lactate (Amlactin) 12 % cream, Apply topically if needed for dry skin. APPLY PER DIRECTED, Disp: , Rfl:     aspirin 81 mg EC tablet, Take 1 tablet (81 mg) by mouth once daily., Disp: , Rfl:     cholecalciferol (Vitamin D-3) 25 MCG (1000 UT) capsule, Take by mouth., Disp: , Rfl:     diclofenac sodium (Voltaren) 1 % gel gel, PER DIRECTED TRANSDERMAL, Disp: , Rfl:      gabapentin (Neurontin) 300 mg capsule, Take 1 capsule (300 mg) by mouth 3 times a day., Disp: 270 capsule, Rfl: 1    losartan (Cozaar) 25 mg tablet, Take 1 tablet (25 mg) by mouth once daily., Disp: 180 tablet, Rfl: 1    simvastatin (Zocor) 40 mg tablet, TAKE ONE TABLET BY MOUTH EVERY DAY, Disp: 90 tablet, Rfl: 1    terazosin (Hytrin) 5 mg capsule, Take 1 capsule (5 mg) by mouth once daily., Disp: 90 capsule, Rfl: 1  Allergies  No Known Allergies  Social History     Socioeconomic History    Marital status:      Spouse name: None    Number of children: 2    Years of education: None    Highest education level: None   Occupational History    Occupation: retired   Tobacco Use    Smoking status: Former     Packs/day: 1.00     Years: 30.00     Additional pack years: 0.00     Total pack years: 30.00     Types: Cigarettes     Quit date:      Years since quittin.8    Smokeless tobacco: Never   Vaping Use    Vaping Use: Never used   Substance and Sexual Activity    Alcohol use: Yes     Comment: special occasions    Drug use: Never    Sexual activity: Not Currently   Other Topics Concern    None   Social History Narrative    None     Social Determinants of Health     Financial Resource Strain: Not on file   Food Insecurity: Not on file   Transportation Needs: Not on file   Physical Activity: Not on file   Stress: Not on file   Social Connections: Not on file   Intimate Partner Violence: Not on file   Housing Stability: Not on file     Family History   Problem Relation Name Age of Onset    Arthritis Mother      Other (CARDIAC DISORDER) Mother      Diabetes Mother      Hypertension Mother      Diabetes Father      Diabetes Sister      Diabetes Brother      Diabetes Sibling       Past Surgical History:   Procedure Laterality Date    CARPAL TUNNEL RELEASE Bilateral 2017    Neuroplasty Decompression Median Nerve At Carpal Tunnel    CATARACT EXTRACTION  2017    Cataract Surgery    COLONOSCOPY  2019     Complete Colonoscopy       REVIEW OF SYSTEMS  DERM:   + as noted in HPI.       Physical examination:   On General Observation: Patient is a pleasant, cooperative, well developed 88 y.o. diabetic   adult male. The patient is alert and oriented to time, place and person. Patient has normal affect and mood.  There were no vitals taken for this visit.    Vascular:  DP and PT pulses are 2/4 b/l.  mild edema noted. mild varicosities b/l.  CFT  5 seconds to all digits bilateral.  Skin temperature is warm to warm from proximal to distal bilateral.      Muscular: Strength is 5/5 for all instrinsic and extrinsic muscle groups.     Neuro:  Proprioception present.   Sensation to vibration is  present. Protective sensation present  at all pedal sites via [employer] Doc 5.07 monofilament bilateral.  Light touch present bilateral.     Derm:    Left toenails: 1-5 Brittleness, crumbling upon debridement, subungual debris, elongation, mycotic appearance, tenderness, and thickness.   Right toenails: 1-5 Brittleness, crumbling upon debridement, subungual debris, elongation, mycotic appearance, tenderness, and thickness.   Hair growth is decreased b/l le    ASSESSMENT:    Tinea Unguium [B35.1]   E11.9  Pain in right toe(s) [M79.674]   Pain in left toe(s) [M79.675]       PLAN:   A comprehensive history and physical examination were preformed. DM foot care and DM foot manifestations were reviewed.  The patient was educated on clinical findings, diagnosis and treatment plans. Patient understands all that has been explained and all questions were answered to apparent satisfaction.     - Debrided  toenails 1-10 in length and height.   - Follow up in 9-12 weeks.       Damien Walter DPM

## 2023-11-27 ENCOUNTER — LAB (OUTPATIENT)
Dept: LAB | Facility: LAB | Age: 88
End: 2023-11-27
Payer: MEDICARE

## 2023-11-27 DIAGNOSIS — N18.32 CHRONIC KIDNEY DISEASE, STAGE 3B (MULTI): ICD-10-CM

## 2023-11-27 DIAGNOSIS — N18.32 CHRONIC KIDNEY DISEASE, STAGE 3B (MULTI): Primary | ICD-10-CM

## 2023-11-27 LAB
25(OH)D3 SERPL-MCNC: 30 NG/ML (ref 30–100)
ALBUMIN SERPL BCP-MCNC: 4 G/DL (ref 3.4–5)
ANION GAP SERPL CALC-SCNC: 13 MMOL/L (ref 10–20)
APPEARANCE UR: CLEAR
BILIRUB UR STRIP.AUTO-MCNC: NEGATIVE MG/DL
BUN SERPL-MCNC: 34 MG/DL (ref 6–23)
CALCIUM SERPL-MCNC: 8.8 MG/DL (ref 8.6–10.6)
CHLORIDE SERPL-SCNC: 109 MMOL/L (ref 98–107)
CO2 SERPL-SCNC: 23 MMOL/L (ref 21–32)
COLOR UR: YELLOW
CREAT SERPL-MCNC: 1.94 MG/DL (ref 0.5–1.3)
CREAT UR-MCNC: 88.6 MG/DL (ref 20–370)
ERYTHROCYTE [DISTWIDTH] IN BLOOD BY AUTOMATED COUNT: 13.4 % (ref 11.5–14.5)
GFR SERPL CREATININE-BSD FRML MDRD: 33 ML/MIN/1.73M*2
GLUCOSE SERPL-MCNC: 139 MG/DL (ref 74–99)
GLUCOSE UR STRIP.AUTO-MCNC: NEGATIVE MG/DL
HCT VFR BLD AUTO: 33.6 % (ref 41–52)
HGB BLD-MCNC: 10.6 G/DL (ref 13.5–17.5)
KETONES UR STRIP.AUTO-MCNC: NEGATIVE MG/DL
LEUKOCYTE ESTERASE UR QL STRIP.AUTO: NEGATIVE
MCH RBC QN AUTO: 31.9 PG (ref 26–34)
MCHC RBC AUTO-ENTMCNC: 31.5 G/DL (ref 32–36)
MCV RBC AUTO: 101 FL (ref 80–100)
MICROALBUMIN UR-MCNC: 89.4 MG/L
MICROALBUMIN/CREAT UR: 100.9 UG/MG CREAT
MUCOUS THREADS #/AREA URNS AUTO: NORMAL /LPF
NITRITE UR QL STRIP.AUTO: NEGATIVE
NRBC BLD-RTO: 0 /100 WBCS (ref 0–0)
PH UR STRIP.AUTO: 5 [PH]
PHOSPHATE SERPL-MCNC: 3.6 MG/DL (ref 2.5–4.9)
PLATELET # BLD AUTO: 169 X10*3/UL (ref 150–450)
POTASSIUM SERPL-SCNC: 5.4 MMOL/L (ref 3.5–5.3)
PROT UR STRIP.AUTO-MCNC: ABNORMAL MG/DL
PTH-INTACT SERPL-MCNC: 118.1 PG/ML (ref 18.5–88)
RBC # BLD AUTO: 3.32 X10*6/UL (ref 4.5–5.9)
RBC # UR STRIP.AUTO: NEGATIVE /UL
RBC #/AREA URNS AUTO: NORMAL /HPF
SODIUM SERPL-SCNC: 140 MMOL/L (ref 136–145)
SP GR UR STRIP.AUTO: 1.02
UROBILINOGEN UR STRIP.AUTO-MCNC: <2 MG/DL
WBC # BLD AUTO: 5.6 X10*3/UL (ref 4.4–11.3)
WBC #/AREA URNS AUTO: NORMAL /HPF

## 2023-11-27 PROCEDURE — 82043 UR ALBUMIN QUANTITATIVE: CPT

## 2023-11-27 PROCEDURE — 36415 COLL VENOUS BLD VENIPUNCTURE: CPT

## 2023-11-27 PROCEDURE — 83970 ASSAY OF PARATHORMONE: CPT

## 2023-11-27 PROCEDURE — 80069 RENAL FUNCTION PANEL: CPT

## 2023-11-27 PROCEDURE — 81001 URINALYSIS AUTO W/SCOPE: CPT

## 2023-11-27 PROCEDURE — 82570 ASSAY OF URINE CREATININE: CPT

## 2023-11-27 PROCEDURE — 82306 VITAMIN D 25 HYDROXY: CPT

## 2023-11-27 PROCEDURE — 85027 COMPLETE CBC AUTOMATED: CPT

## 2023-12-13 ENCOUNTER — OFFICE VISIT (OUTPATIENT)
Dept: PRIMARY CARE | Facility: CLINIC | Age: 88
End: 2023-12-13
Payer: MEDICARE

## 2023-12-13 VITALS
BODY MASS INDEX: 32.83 KG/M2 | HEART RATE: 68 BPM | WEIGHT: 186.8 LBS | DIASTOLIC BLOOD PRESSURE: 48 MMHG | SYSTOLIC BLOOD PRESSURE: 144 MMHG

## 2023-12-13 DIAGNOSIS — R60.0 BILATERAL LOWER EXTREMITY EDEMA: ICD-10-CM

## 2023-12-13 DIAGNOSIS — N18.32 ANEMIA DUE TO STAGE 3B CHRONIC KIDNEY DISEASE (MULTI): ICD-10-CM

## 2023-12-13 DIAGNOSIS — E87.5 HYPERKALEMIA: Primary | ICD-10-CM

## 2023-12-13 DIAGNOSIS — D63.1 ANEMIA DUE TO STAGE 3B CHRONIC KIDNEY DISEASE (MULTI): ICD-10-CM

## 2023-12-13 DIAGNOSIS — I10 PRIMARY HYPERTENSION: ICD-10-CM

## 2023-12-13 DIAGNOSIS — E11.9 DIABETES MELLITUS WITHOUT COMPLICATION (MULTI): ICD-10-CM

## 2023-12-13 DIAGNOSIS — M48.062 SPINAL STENOSIS, LUMBAR REGION, WITH NEUROGENIC CLAUDICATION: ICD-10-CM

## 2023-12-13 DIAGNOSIS — E78.2 MIXED HYPERLIPIDEMIA: ICD-10-CM

## 2023-12-13 LAB
ANION GAP SERPL CALC-SCNC: 12 MMOL/L (ref 10–20)
BUN SERPL-MCNC: 30 MG/DL (ref 6–23)
CALCIUM SERPL-MCNC: 8.6 MG/DL (ref 8.6–10.6)
CHLORIDE SERPL-SCNC: 107 MMOL/L (ref 98–107)
CO2 SERPL-SCNC: 24 MMOL/L (ref 21–32)
CREAT SERPL-MCNC: 1.91 MG/DL (ref 0.5–1.3)
EST. AVERAGE GLUCOSE BLD GHB EST-MCNC: 154 MG/DL
GFR SERPL CREATININE-BSD FRML MDRD: 33 ML/MIN/1.73M*2
GLUCOSE SERPL-MCNC: 198 MG/DL (ref 74–99)
HBA1C MFR BLD: 7 %
POTASSIUM SERPL-SCNC: 4.6 MMOL/L (ref 3.5–5.3)
SODIUM SERPL-SCNC: 138 MMOL/L (ref 136–145)

## 2023-12-13 PROCEDURE — 36415 COLL VENOUS BLD VENIPUNCTURE: CPT

## 2023-12-13 PROCEDURE — 99214 OFFICE O/P EST MOD 30 MIN: CPT | Performed by: INTERNAL MEDICINE

## 2023-12-13 PROCEDURE — 3077F SYST BP >= 140 MM HG: CPT | Performed by: INTERNAL MEDICINE

## 2023-12-13 PROCEDURE — 1160F RVW MEDS BY RX/DR IN RCRD: CPT | Performed by: INTERNAL MEDICINE

## 2023-12-13 PROCEDURE — 83036 HEMOGLOBIN GLYCOSYLATED A1C: CPT

## 2023-12-13 PROCEDURE — 1159F MED LIST DOCD IN RCRD: CPT | Performed by: INTERNAL MEDICINE

## 2023-12-13 PROCEDURE — 80048 BASIC METABOLIC PNL TOTAL CA: CPT

## 2023-12-13 PROCEDURE — 1036F TOBACCO NON-USER: CPT | Performed by: INTERNAL MEDICINE

## 2023-12-13 PROCEDURE — 3078F DIAST BP <80 MM HG: CPT | Performed by: INTERNAL MEDICINE

## 2023-12-13 ASSESSMENT — ENCOUNTER SYMPTOMS
UNEXPECTED WEIGHT CHANGE: 0
ACTIVITY CHANGE: 0
ARTHRALGIAS: 0
HEMATURIA: 0
COUGH: 0
FEVER: 0
LIGHT-HEADEDNESS: 0
DIZZINESS: 0
WHEEZING: 0
CHEST TIGHTNESS: 0
FATIGUE: 0
ABDOMINAL PAIN: 0
CONSTIPATION: 0
DYSURIA: 0
HEADACHES: 0
CHILLS: 0
FREQUENCY: 0
PALPITATIONS: 0
BACK PAIN: 1
DIFFICULTY URINATING: 0
NAUSEA: 0
VOMITING: 0
APPETITE CHANGE: 0
SHORTNESS OF BREATH: 0
DIARRHEA: 0

## 2023-12-13 NOTE — ASSESSMENT & PLAN NOTE
Pt had blood work late 11/2023 by nephrology-noted potassium high  Was taken of Losartan due to this  Will repeat BMP at this time

## 2023-12-13 NOTE — PATIENT INSTRUCTIONS
Call nephrologist and let them know you do have a fair amount of swelling in the lower extremities   Also let them know BP was 156/54 at our office today-so a bit higher than we would like   We did repeat blood work today to check to make sure potassium levels have decreased   Continue other meds as directed  Follow up here in 3 months-sooner if needed  Happy Birthday!

## 2023-12-13 NOTE — ASSESSMENT & PLAN NOTE
Worse today on exam likely secondary to Amlodipine   He was instructed to call nephrology to report this and have them adjust BP meds accordingly

## 2023-12-13 NOTE — PROGRESS NOTES
Subjective   Patient ID: Nik Washington is a 89 y.o. male who presents for Follow-up (3m).    HPI  Pt here in 3 month follow up.  He had his flu shot/Covid boosters.      He tells me his nephrologist changed his BP meds a bit-took him off Losartan and increased Amlodipine to 5 mg/day.  His BP is a bit higher here today.  At home he is getting readings around 140/60.    He is taking Tylenol ES three times a day for his back.  He is Gabapentin dose has been reduced to only one a day.  He continues to have back pain but it is not worse.          Review of Systems   Constitutional:  Negative for activity change, appetite change, chills, fatigue, fever and unexpected weight change.   Respiratory:  Negative for cough, chest tightness, shortness of breath and wheezing.    Cardiovascular:  Positive for leg swelling. Negative for chest pain and palpitations.   Gastrointestinal:  Negative for abdominal pain, constipation, diarrhea, nausea and vomiting.   Genitourinary:  Negative for difficulty urinating, dysuria, frequency and hematuria.   Musculoskeletal:  Positive for back pain. Negative for arthralgias.   Neurological:  Negative for dizziness, light-headedness and headaches.       Objective   BP (!) 144/48   Pulse 68   Wt 84.7 kg (186 lb 12.8 oz)   BMI 32.83 kg/m²    Physical Exam  Constitutional:       Appearance: Normal appearance.   Cardiovascular:      Rate and Rhythm: Normal rate and regular rhythm.      Heart sounds: Normal heart sounds.   Pulmonary:      Effort: Pulmonary effort is normal.      Breath sounds: Normal breath sounds.   Abdominal:      General: Bowel sounds are normal.      Palpations: Abdomen is soft.   Musculoskeletal:      Right lower leg: Edema (2+ edema) present.      Left lower leg: Edema (2+ edema) present.   Lymphadenopathy:      Cervical: No cervical adenopathy.   Neurological:      Mental Status: He is alert and oriented to person, place, and time.   Psychiatric:         Mood and  Affect: Mood normal.         Assessment/Plan   Problem List Items Addressed This Visit       Hyperlipidemia    Hypertension     BP a bit high today  Had losartan stopped secondary to hyperkalemia  Will call nephrology to let them know about amlodipine causing swelling in LE         Relevant Orders    Follow Up In Primary Care - Established    Spinal stenosis, lumbar region, with neurogenic claudication     Gabapentin dose weaning down-only on daily therapy  Uses tylenol scheduled to help with pain          Relevant Orders    Follow Up In Primary Care - Established    Anemia due to stage 3b chronic kidney disease (CMS/[facility])     Follows with nephrology  This is secondary to ckd         Relevant Orders    Basic Metabolic Panel    Follow Up In Primary Care - Established    Diabetes mellitus without complication (CMS/[facility])     Repeat A1c  Not on meds  A1c has been <7   Diet control         Relevant Orders    Hemoglobin A1C    Follow Up In Primary Care - Established    Bilateral lower extremity edema     Worse today on exam likely secondary to Amlodipine   He was instructed to call nephrology to report this and have them adjust BP meds accordingly          Hyperkalemia - Primary     Pt had blood work late 11/2023 by nephrology-noted potassium high  Was taken of Losartan due to this  Will repeat BMP at this time          Relevant Orders    Basic Metabolic Panel

## 2023-12-13 NOTE — ASSESSMENT & PLAN NOTE
BP a bit high today  Had losartan stopped secondary to hyperkalemia  Will call nephrology to let them know about amlodipine causing swelling in LE

## 2023-12-14 ENCOUNTER — TELEPHONE (OUTPATIENT)
Dept: PRIMARY CARE | Facility: CLINIC | Age: 88
End: 2023-12-14
Payer: MEDICARE

## 2023-12-14 NOTE — TELEPHONE ENCOUNTER
----- Message from Krystyna MARIANO DO sent at 12/14/2023  8:55 AM EST -----  Sugars are a bit higher this time-A1c is 7.0% which still does not require medication but would try to improve diet-less carbs/sugar

## 2023-12-30 DIAGNOSIS — N40.1 BENIGN PROSTATIC HYPERPLASIA WITH LOWER URINARY TRACT SYMPTOMS, SYMPTOM DETAILS UNSPECIFIED: ICD-10-CM

## 2024-01-02 RX ORDER — TERAZOSIN 5 MG/1
5 CAPSULE ORAL DAILY
Qty: 90 CAPSULE | Refills: 1 | Status: SHIPPED | OUTPATIENT
Start: 2024-01-02 | End: 2024-01-17

## 2024-01-16 DIAGNOSIS — N40.1 BENIGN PROSTATIC HYPERPLASIA WITH LOWER URINARY TRACT SYMPTOMS, SYMPTOM DETAILS UNSPECIFIED: ICD-10-CM

## 2024-01-17 RX ORDER — TERAZOSIN 5 MG/1
5 CAPSULE ORAL DAILY
Qty: 90 CAPSULE | Refills: 1 | Status: SHIPPED | OUTPATIENT
Start: 2024-01-17

## 2024-01-25 DIAGNOSIS — E78.5 HYPERLIPIDEMIA, UNSPECIFIED HYPERLIPIDEMIA TYPE: ICD-10-CM

## 2024-01-25 RX ORDER — SIMVASTATIN 40 MG/1
40 TABLET, FILM COATED ORAL DAILY
Qty: 90 TABLET | Refills: 1 | Status: SHIPPED | OUTPATIENT
Start: 2024-01-25

## 2024-01-28 DIAGNOSIS — E11.42 DIABETIC POLYNEUROPATHY ASSOCIATED WITH TYPE 2 DIABETES MELLITUS (MULTI): ICD-10-CM

## 2024-01-29 RX ORDER — GABAPENTIN 300 MG/1
300 CAPSULE ORAL DAILY
Qty: 90 CAPSULE | Refills: 1 | Status: SHIPPED | OUTPATIENT
Start: 2024-01-29

## 2024-02-02 ENCOUNTER — OFFICE VISIT (OUTPATIENT)
Dept: PODIATRY | Facility: CLINIC | Age: 89
End: 2024-02-02
Payer: MEDICARE

## 2024-02-02 VITALS — SYSTOLIC BLOOD PRESSURE: 160 MMHG | DIASTOLIC BLOOD PRESSURE: 80 MMHG

## 2024-02-02 DIAGNOSIS — B35.1 TINEA UNGUIUM: Primary | ICD-10-CM

## 2024-02-02 DIAGNOSIS — E11.9 DIABETES MELLITUS WITHOUT COMPLICATION (MULTI): ICD-10-CM

## 2024-02-02 DIAGNOSIS — M79.674 TOE PAIN, RIGHT: ICD-10-CM

## 2024-02-02 DIAGNOSIS — M79.675 TOE PAIN, LEFT: ICD-10-CM

## 2024-02-02 PROCEDURE — 11721 DEBRIDE NAIL 6 OR MORE: CPT | Performed by: PODIATRIST

## 2024-02-02 PROCEDURE — 1157F ADVNC CARE PLAN IN RCRD: CPT | Performed by: PODIATRIST

## 2024-02-02 PROCEDURE — 3077F SYST BP >= 140 MM HG: CPT | Performed by: PODIATRIST

## 2024-02-02 PROCEDURE — 3079F DIAST BP 80-89 MM HG: CPT | Performed by: PODIATRIST

## 2024-02-02 PROCEDURE — 1036F TOBACCO NON-USER: CPT | Performed by: PODIATRIST

## 2024-02-02 NOTE — PROGRESS NOTES
CC:  painful thickened and elongated toenails and diabetic care.     HPI: Pt presents for dm foot exam and painful thickened and elongated toenails that are difficult to manage.  Onset was gradual with worsening course until recently.  Aggravated by shoe gear and ambulation.       PCP: Dr. Shultz  Last visit: 12-13-23     PMH  Past Medical History:   Diagnosis Date    Elevated prostate specific antigen (PSA) 02/23/2021    High prostate specific antigen (PSA)    Encounter for general adult medical examination without abnormal findings 09/29/2021    Medicare annual wellness visit, subsequent    Other conditions influencing health status 05/26/2021    Diabetes mellitus type 2, uncontrolled    Other specified symptoms and signs involving the circulatory and respiratory systems     Abdominal bruit    Personal history of other diseases of the nervous system and sense organs 12/01/2017    History of carpal tunnel syndrome    Personal history of other drug therapy 09/29/2021    History of influenza vaccination    Personal history of other endocrine, nutritional and metabolic disease     History of hyperkalemia    Personal history of other malignant neoplasm of skin     History of malignant neoplasm of skin     MEDS    Current Outpatient Medications:     acetaminophen (Tylenol) 500 mg capsule, Take 2 capsules (1,000 mg) by mouth every 6 hours if needed., Disp: , Rfl:     amLODIPine (Norvasc) 5 mg tablet, Take 1 tablet (5 mg) by mouth once daily., Disp: , Rfl:     ammonium lactate (Amlactin) 12 % cream, Apply topically if needed for dry skin. APPLY PER DIRECTED, Disp: , Rfl:     aspirin 81 mg EC tablet, Take 1 tablet (81 mg) by mouth once daily., Disp: , Rfl:     cholecalciferol (Vitamin D-3) 25 MCG (1000 UT) capsule, Take by mouth., Disp: , Rfl:     diclofenac sodium (Voltaren) 1 % gel gel, PER DIRECTED TRANSDERMAL, Disp: , Rfl:     gabapentin (Neurontin) 300 mg capsule, Take 1 capsule (300 mg) by mouth once daily., Disp: 90  capsule, Rfl: 1    simvastatin (Zocor) 40 mg tablet, TAKE ONE TABLET BY MOUTH EVERY DAY, Disp: 90 tablet, Rfl: 1    terazosin (Hytrin) 5 mg capsule, TAKE ONE CAPSULE BY MOUTH EVERY DAY, Disp: 90 capsule, Rfl: 1  Allergies  No Known Allergies  Social History     Socioeconomic History    Marital status:      Spouse name: Not on file    Number of children: 2    Years of education: Not on file    Highest education level: Not on file   Occupational History    Occupation: retired   Tobacco Use    Smoking status: Former     Packs/day: 1.00     Years: 30.00     Additional pack years: 0.00     Total pack years: 30.00     Types: Cigarettes     Quit date:      Years since quittin.1    Smokeless tobacco: Never   Vaping Use    Vaping Use: Never used   Substance and Sexual Activity    Alcohol use: Yes     Comment: special occasions    Drug use: Never    Sexual activity: Not Currently   Other Topics Concern    Not on file   Social History Narrative    Not on file     Social Determinants of Health     Financial Resource Strain: Not on file   Food Insecurity: Not on file   Transportation Needs: Not on file   Physical Activity: Not on file   Stress: Not on file   Social Connections: Not on file   Intimate Partner Violence: Not on file   Housing Stability: Not on file     Family History   Problem Relation Name Age of Onset    Arthritis Mother      Other (CARDIAC DISORDER) Mother      Diabetes Mother      Hypertension Mother      Diabetes Father      Diabetes Sister      Diabetes Brother      Diabetes Sibling       Past Surgical History:   Procedure Laterality Date    CARPAL TUNNEL RELEASE Bilateral 2017    Neuroplasty Decompression Median Nerve At Carpal Tunnel    CATARACT EXTRACTION  2017    Cataract Surgery    COLONOSCOPY  2019    Complete Colonoscopy       REVIEW OF SYSTEMS  DERM:   + as noted in HPI.       Physical examination:   On General Observation: Patient is a pleasant, cooperative, well  developed 89 y.o. diabetic   adult male. The patient is alert and oriented to time, place and person. Patient has normal affect and mood.  /80     Vascular:  DP and PT pulses are 2/4 b/l.  mild edema noted. mild varicosities b/l.  CFT  5 seconds to all digits bilateral.  Skin temperature is warm to warm from proximal to distal bilateral.      Muscular: Strength is 5/5 for all instrinsic and extrinsic muscle groups.     Neuro:  Proprioception present.   Sensation to vibration is  intact. Protective sensation present  at all pedal sites via [place] [employer] 5.07 monofilament bilateral.  Light touch intact bilateral.     Derm:    Left toenails: 1-5 Brittleness, crumbling upon debridement, subungual debris, elongation, mycotic appearance, tenderness, and thickness.   Right toenails: 1-5 Brittleness, crumbling upon debridement, subungual debris, elongation, mycotic appearance, tenderness, and thickness.   Hair growth is decreased b/l le    ASSESSMENT:    Tinea Unguium [B35.1]   E11.9  Pain in right toe(s) [M79.674]   Pain in left toe(s) [M79.675]       PLAN:   DM foot care and DM foot manifestations were reviewed.  The patient was educated on clinical findings, diagnosis and treatment plans. Patient understands all that has been explained and all questions were answered to apparent satisfaction.     - Debrided toenails 1-10 in length and height.   - Follow up in 9-12 weeks.       Damien Walter DPM

## 2024-02-26 ENCOUNTER — LAB (OUTPATIENT)
Dept: LAB | Facility: LAB | Age: 89
End: 2024-02-26
Payer: MEDICARE

## 2024-02-26 DIAGNOSIS — N18.32 CHRONIC KIDNEY DISEASE, STAGE 3B (MULTI): Primary | ICD-10-CM

## 2024-02-26 LAB
ANION GAP SERPL CALC-SCNC: 14 MMOL/L (ref 10–20)
BUN SERPL-MCNC: 31 MG/DL (ref 6–23)
CALCIUM SERPL-MCNC: 8.8 MG/DL (ref 8.6–10.6)
CHLORIDE SERPL-SCNC: 103 MMOL/L (ref 98–107)
CO2 SERPL-SCNC: 26 MMOL/L (ref 21–32)
CREAT SERPL-MCNC: 1.81 MG/DL (ref 0.5–1.3)
EGFRCR SERPLBLD CKD-EPI 2021: 35 ML/MIN/1.73M*2
GLUCOSE SERPL-MCNC: 207 MG/DL (ref 74–99)
POTASSIUM SERPL-SCNC: 4.9 MMOL/L (ref 3.5–5.3)
SODIUM SERPL-SCNC: 138 MMOL/L (ref 136–145)

## 2024-02-26 PROCEDURE — 36415 COLL VENOUS BLD VENIPUNCTURE: CPT

## 2024-02-26 PROCEDURE — 80048 BASIC METABOLIC PNL TOTAL CA: CPT

## 2024-03-08 ENCOUNTER — OFFICE VISIT (OUTPATIENT)
Dept: CARDIOLOGY | Facility: CLINIC | Age: 89
End: 2024-03-08
Payer: MEDICARE

## 2024-03-08 VITALS
DIASTOLIC BLOOD PRESSURE: 60 MMHG | HEART RATE: 79 BPM | WEIGHT: 178.2 LBS | OXYGEN SATURATION: 97 % | SYSTOLIC BLOOD PRESSURE: 160 MMHG | BODY MASS INDEX: 30.42 KG/M2 | HEIGHT: 64 IN

## 2024-03-08 DIAGNOSIS — C64.9 RENAL CELL CARCINOMA, UNSPECIFIED LATERALITY (MULTI): ICD-10-CM

## 2024-03-08 DIAGNOSIS — I45.10 COMPLETE RIGHT BUNDLE BRANCH BLOCK: ICD-10-CM

## 2024-03-08 DIAGNOSIS — M54.50 CHRONIC LOW BACK PAIN, UNSPECIFIED BACK PAIN LATERALITY, UNSPECIFIED WHETHER SCIATICA PRESENT: ICD-10-CM

## 2024-03-08 DIAGNOSIS — E78.00 PURE HYPERCHOLESTEROLEMIA: ICD-10-CM

## 2024-03-08 DIAGNOSIS — E66.9 CLASS 1 OBESITY WITHOUT SERIOUS COMORBIDITY WITH BODY MASS INDEX (BMI) OF 30.0 TO 30.9 IN ADULT, UNSPECIFIED OBESITY TYPE: ICD-10-CM

## 2024-03-08 DIAGNOSIS — R00.1 BRADYCARDIA: Primary | ICD-10-CM

## 2024-03-08 DIAGNOSIS — G89.29 CHRONIC LOW BACK PAIN, UNSPECIFIED BACK PAIN LATERALITY, UNSPECIFIED WHETHER SCIATICA PRESENT: ICD-10-CM

## 2024-03-08 DIAGNOSIS — R00.1 BRADYCARDIA, SINUS: ICD-10-CM

## 2024-03-08 DIAGNOSIS — I10 PRIMARY HYPERTENSION: ICD-10-CM

## 2024-03-08 PROBLEM — E66.811 CLASS 1 OBESITY WITHOUT SERIOUS COMORBIDITY WITH BODY MASS INDEX (BMI) OF 30.0 TO 30.9 IN ADULT: Status: ACTIVE | Noted: 2024-03-08

## 2024-03-08 PROCEDURE — 93000 ELECTROCARDIOGRAM COMPLETE: CPT | Performed by: INTERNAL MEDICINE

## 2024-03-08 PROCEDURE — 1159F MED LIST DOCD IN RCRD: CPT | Performed by: INTERNAL MEDICINE

## 2024-03-08 PROCEDURE — 3077F SYST BP >= 140 MM HG: CPT | Performed by: INTERNAL MEDICINE

## 2024-03-08 PROCEDURE — 1160F RVW MEDS BY RX/DR IN RCRD: CPT | Performed by: INTERNAL MEDICINE

## 2024-03-08 PROCEDURE — 3078F DIAST BP <80 MM HG: CPT | Performed by: INTERNAL MEDICINE

## 2024-03-08 PROCEDURE — 99214 OFFICE O/P EST MOD 30 MIN: CPT | Performed by: INTERNAL MEDICINE

## 2024-03-08 PROCEDURE — 1157F ADVNC CARE PLAN IN RCRD: CPT | Performed by: INTERNAL MEDICINE

## 2024-03-08 PROCEDURE — 1036F TOBACCO NON-USER: CPT | Performed by: INTERNAL MEDICINE

## 2024-03-15 ENCOUNTER — OFFICE VISIT (OUTPATIENT)
Dept: PRIMARY CARE | Facility: CLINIC | Age: 89
End: 2024-03-15
Payer: MEDICARE

## 2024-03-15 VITALS
HEART RATE: 68 BPM | SYSTOLIC BLOOD PRESSURE: 148 MMHG | BODY MASS INDEX: 30.62 KG/M2 | WEIGHT: 178.4 LBS | DIASTOLIC BLOOD PRESSURE: 70 MMHG

## 2024-03-15 DIAGNOSIS — E11.42 DIABETIC POLYNEUROPATHY ASSOCIATED WITH TYPE 2 DIABETES MELLITUS (MULTI): ICD-10-CM

## 2024-03-15 DIAGNOSIS — I10 PRIMARY HYPERTENSION: ICD-10-CM

## 2024-03-15 DIAGNOSIS — D63.1 ANEMIA DUE TO STAGE 3B CHRONIC KIDNEY DISEASE (MULTI): ICD-10-CM

## 2024-03-15 DIAGNOSIS — M48.062 SPINAL STENOSIS, LUMBAR REGION, WITH NEUROGENIC CLAUDICATION: ICD-10-CM

## 2024-03-15 DIAGNOSIS — N25.81 SECONDARY HYPERPARATHYROIDISM OF RENAL ORIGIN (MULTI): ICD-10-CM

## 2024-03-15 DIAGNOSIS — N18.32 STAGE 3B CHRONIC KIDNEY DISEASE (MULTI): ICD-10-CM

## 2024-03-15 DIAGNOSIS — E11.9 DIABETES MELLITUS WITHOUT COMPLICATION (MULTI): ICD-10-CM

## 2024-03-15 DIAGNOSIS — R60.0 BILATERAL LOWER EXTREMITY EDEMA: Primary | ICD-10-CM

## 2024-03-15 DIAGNOSIS — N18.32 ANEMIA DUE TO STAGE 3B CHRONIC KIDNEY DISEASE (MULTI): ICD-10-CM

## 2024-03-15 LAB — POC HEMOGLOBIN A1C: 8.1 % (ref 4.2–6.5)

## 2024-03-15 PROCEDURE — 3077F SYST BP >= 140 MM HG: CPT | Performed by: INTERNAL MEDICINE

## 2024-03-15 PROCEDURE — 1160F RVW MEDS BY RX/DR IN RCRD: CPT | Performed by: INTERNAL MEDICINE

## 2024-03-15 PROCEDURE — 99213 OFFICE O/P EST LOW 20 MIN: CPT | Performed by: INTERNAL MEDICINE

## 2024-03-15 PROCEDURE — 1036F TOBACCO NON-USER: CPT | Performed by: INTERNAL MEDICINE

## 2024-03-15 PROCEDURE — 1159F MED LIST DOCD IN RCRD: CPT | Performed by: INTERNAL MEDICINE

## 2024-03-15 PROCEDURE — 3078F DIAST BP <80 MM HG: CPT | Performed by: INTERNAL MEDICINE

## 2024-03-15 PROCEDURE — 1157F ADVNC CARE PLAN IN RCRD: CPT | Performed by: INTERNAL MEDICINE

## 2024-03-15 PROCEDURE — 83036 HEMOGLOBIN GLYCOSYLATED A1C: CPT | Performed by: INTERNAL MEDICINE

## 2024-03-15 RX ORDER — DAPAGLIFLOZIN 5 MG/1
5 TABLET, FILM COATED ORAL DAILY
Qty: 90 TABLET | Refills: 1 | Status: SHIPPED | OUTPATIENT
Start: 2024-03-15 | End: 2024-03-18

## 2024-03-15 ASSESSMENT — ENCOUNTER SYMPTOMS
VOMITING: 0
DIZZINESS: 0
FATIGUE: 0
FLANK PAIN: 0
ACTIVITY CHANGE: 0
DIFFICULTY URINATING: 0
WHEEZING: 0
APPETITE CHANGE: 0
ABDOMINAL PAIN: 0
FEVER: 0
COUGH: 0
FREQUENCY: 0
CONFUSION: 0
UNEXPECTED WEIGHT CHANGE: 0
NUMBNESS: 1
SLEEP DISTURBANCE: 0
DYSPHORIC MOOD: 0
DIARRHEA: 0
HEADACHES: 0
NAUSEA: 0
LIGHT-HEADEDNESS: 0
CONSTIPATION: 0
CHEST TIGHTNESS: 0
NERVOUS/ANXIOUS: 0
BLOOD IN STOOL: 0
BACK PAIN: 1
PALPITATIONS: 0
CHILLS: 0
SHORTNESS OF BREATH: 0

## 2024-03-15 NOTE — PROGRESS NOTES
Subjective   Patient ID: Nik Washington is a 89 y.o. male who presents for Follow-up (3m).    HPI  Pt here in 3 month follow up.  His appetite is ok-he is trying to cut down a bit on eating.  He sleeps well and energy is decent.   He tries to walk few days a week but then his back will bother him.      He sees cardiology and just had a visit 3/8.  No changes noted per note.      He saw nephrologist on 3/11 and they discussed could reduce Gabapentin if he is bothered by the LE edema.  Note reviewed and he said ok to remain on Amlodipine/Gabapentin if swelling doesn't bother him.   He had labs in late Feb that showed stable GFR 35.      His A1C in 12/2023 that is 7.0%.  He is not on any meds at this time.      He sees podiatry for toenail debridement and to assess for diabetic neuropathy.        Review of Systems   Constitutional:  Negative for activity change, appetite change, chills, fatigue, fever and unexpected weight change.   Respiratory:  Negative for cough, chest tightness, shortness of breath and wheezing.    Cardiovascular:  Positive for leg swelling. Negative for chest pain and palpitations.   Gastrointestinal:  Negative for abdominal pain, blood in stool, constipation, diarrhea, nausea and vomiting.   Genitourinary:  Negative for difficulty urinating, flank pain and frequency.   Musculoskeletal:  Positive for back pain.   Neurological:  Positive for numbness. Negative for dizziness, light-headedness and headaches.   Psychiatric/Behavioral:  Negative for confusion, dysphoric mood and sleep disturbance. The patient is not nervous/anxious.        Objective   /70 (BP Location: Left arm, Patient Position: Sitting)   Pulse 68   Wt 80.9 kg (178 lb 6.4 oz)   BMI 30.62 kg/m²    Physical Exam  Constitutional:       Appearance: Normal appearance.   Cardiovascular:      Rate and Rhythm: Normal rate and regular rhythm.      Heart sounds: Normal heart sounds.   Pulmonary:      Effort: Pulmonary effort is  normal.      Breath sounds: Normal breath sounds. No wheezing, rhonchi or rales.   Chest:      Chest wall: No tenderness.   Abdominal:      General: Bowel sounds are normal.      Palpations: Abdomen is soft.   Musculoskeletal:      Right lower leg: Edema present.      Left lower leg: Edema present.   Lymphadenopathy:      Cervical: No cervical adenopathy.   Neurological:      Mental Status: He is alert.         Assessment/Plan   Problem List Items Addressed This Visit       Chronic kidney disease    Relevant Medications    dapagliflozin propanediol (Farxiga) 5 mg    Other Relevant Orders    Follow Up In Primary Care - Established    Diabetic polyneuropathy associated with type 2 diabetes mellitus (CMS/HCC)     On low dose Gabapentin but unsure if helpful; will try to wean off this  Uses Tylenol TID which helps with pain   Sees podiatry regularly for foot care  A1c is up  Will start Farxiga 5 mg daily due to CKD history-do have a call out to his nephrologist to make sure ok          Relevant Medications    dapagliflozin propanediol (Farxiga) 5 mg    Other Relevant Orders    Follow Up In Primary Care - Established    Hypertension     Stable BP on low dose Amlodipine  Sees nephrology          Spinal stenosis, lumbar region, with neurogenic claudication     Uses scheduled Tylenol TID with some relief  Finds sitting for long periods of time is worse  Tries to walk few days a week          Anemia due to stage 3b chronic kidney disease (CMS/HCC)    Secondary hyperparathyroidism of renal origin (CMS/[facility])     Sees nephrology due to CKD   Had recent visit          Diabetes mellitus without complication (CMS/[facility])    Relevant Orders    POCT glycosylated hemoglobin (Hb A1C) manually resulted (Completed)    Bilateral lower extremity edema - Primary

## 2024-03-15 NOTE — PATIENT INSTRUCTIONS
Take a trial of few days-1 week off the gabapentin and see if your pain changes in any manor-if no changes in pain are noted stay off it  Start Farxiga 5 mg daily due to higher sugar reading (8.1) and its benefit to the kidneys   Watch and lower if able sugar/carbohydrates (breads/pastas/starches)   Continue all other meds as directed  Follow up with specialist when due  Follow up here in 3 months

## 2024-03-15 NOTE — ASSESSMENT & PLAN NOTE
On low dose Gabapentin but unsure if helpful; will try to wean off this  Uses Tylenol TID which helps with pain   Sees podiatry regularly for foot care  A1c is up  Will start Farxiga 5 mg daily due to CKD history-do have a call out to his nephrologist to make sure ok

## 2024-03-15 NOTE — ASSESSMENT & PLAN NOTE
Uses scheduled Tylenol TID with some relief  Finds sitting for long periods of time is worse  Tries to walk few days a week

## 2024-03-18 ENCOUNTER — TELEPHONE (OUTPATIENT)
Dept: PRIMARY CARE | Facility: CLINIC | Age: 89
End: 2024-03-18
Payer: MEDICARE

## 2024-03-18 DIAGNOSIS — E11.9 DIABETES MELLITUS WITHOUT COMPLICATION (MULTI): Primary | ICD-10-CM

## 2024-03-18 RX ORDER — GLIMEPIRIDE 2 MG/1
2 TABLET ORAL
Qty: 100 TABLET | Refills: 3 | Status: SHIPPED | OUTPATIENT
Start: 2024-03-18 | End: 2025-04-22

## 2024-03-18 NOTE — TELEPHONE ENCOUNTER
Informed patient with instructions.  He said yes, whichever med you think he should be on, please send over the pharmacy.

## 2024-03-18 NOTE — TELEPHONE ENCOUNTER
Patient called and said the medication you called in for him cost over $500.  Is there something less expensive you can call in?

## 2024-03-25 DIAGNOSIS — E11.9 DIABETES MELLITUS WITHOUT COMPLICATION (MULTI): ICD-10-CM

## 2024-03-25 RX ORDER — LANCETS
1 EACH MISCELLANEOUS DAILY
Qty: 100 EACH | Refills: 3 | Status: SHIPPED | OUTPATIENT
Start: 2024-03-25

## 2024-03-25 RX ORDER — BLOOD SUGAR DIAGNOSTIC
1 STRIP MISCELLANEOUS DAILY
Qty: 100 STRIP | Refills: 3 | Status: SHIPPED | OUTPATIENT
Start: 2024-03-25

## 2024-05-02 ENCOUNTER — OFFICE VISIT (OUTPATIENT)
Dept: PODIATRY | Facility: CLINIC | Age: 89
End: 2024-05-02
Payer: MEDICARE

## 2024-05-02 DIAGNOSIS — M79.674 TOE PAIN, RIGHT: Primary | ICD-10-CM

## 2024-05-02 DIAGNOSIS — E11.9 DIABETES MELLITUS WITHOUT COMPLICATION (MULTI): ICD-10-CM

## 2024-05-02 DIAGNOSIS — B35.1 TINEA UNGUIUM: ICD-10-CM

## 2024-05-02 DIAGNOSIS — M79.675 TOE PAIN, LEFT: ICD-10-CM

## 2024-05-02 PROCEDURE — 1157F ADVNC CARE PLAN IN RCRD: CPT | Performed by: PODIATRIST

## 2024-05-02 PROCEDURE — 1159F MED LIST DOCD IN RCRD: CPT | Performed by: PODIATRIST

## 2024-05-02 PROCEDURE — 1160F RVW MEDS BY RX/DR IN RCRD: CPT | Performed by: PODIATRIST

## 2024-05-02 PROCEDURE — 11721 DEBRIDE NAIL 6 OR MORE: CPT | Performed by: PODIATRIST

## 2024-05-02 NOTE — PROGRESS NOTES
CC:  painful thickened and elongated toenails and diabetic care.      HPI: Pt presents for dm foot exam and painful thickened and elongated toenails that are difficult to manage.  Onset was gradual with worsening course until recently.  Aggravated by shoe gear and ambulation. Had some issues with swelling feet and BP meds, resolved.        PCP: Dr. Shultz  Last visit: 3-15-24     PMH  Medical History        Past Medical History:   Diagnosis Date    Elevated prostate specific antigen (PSA) 02/23/2021     High prostate specific antigen (PSA)    Encounter for general adult medical examination without abnormal findings 09/29/2021     Medicare annual wellness visit, subsequent    Other conditions influencing health status 05/26/2021     Diabetes mellitus type 2, uncontrolled    Other specified symptoms and signs involving the circulatory and respiratory systems       Abdominal bruit    Personal history of other diseases of the nervous system and sense organs 12/01/2017     History of carpal tunnel syndrome    Personal history of other drug therapy 09/29/2021     History of influenza vaccination    Personal history of other endocrine, nutritional and metabolic disease       History of hyperkalemia    Personal history of other malignant neoplasm of skin       History of malignant neoplasm of skin         MEDS     Current Outpatient Medications:     acetaminophen (Tylenol) 500 mg capsule, Take 2 capsules (1,000 mg) by mouth every 6 hours if needed., Disp: , Rfl:     amLODIPine (Norvasc) 5 mg tablet, Take 1 tablet (5 mg) by mouth once daily., Disp: , Rfl:     ammonium lactate (Amlactin) 12 % cream, Apply topically if needed for dry skin. APPLY PER DIRECTED, Disp: , Rfl:     aspirin 81 mg EC tablet, Take 1 tablet (81 mg) by mouth once daily., Disp: , Rfl:     cholecalciferol (Vitamin D-3) 25 MCG (1000 UT) capsule, Take by mouth., Disp: , Rfl:     diclofenac sodium (Voltaren) 1 % gel gel, PER DIRECTED TRANSDERMAL, Disp: , Rfl:      gabapentin (Neurontin) 300 mg capsule, Take 1 capsule (300 mg) by mouth once daily., Disp: 90 capsule, Rfl: 1    simvastatin (Zocor) 40 mg tablet, TAKE ONE TABLET BY MOUTH EVERY DAY, Disp: 90 tablet, Rfl: 1    terazosin (Hytrin) 5 mg capsule, TAKE ONE CAPSULE BY MOUTH EVERY DAY, Disp: 90 capsule, Rfl: 1  Allergies  No Known Allergies  Social History   Social History            Socioeconomic History    Marital status:        Spouse name: Not on file    Number of children: 2    Years of education: Not on file    Highest education level: Not on file   Occupational History    Occupation: retired   Tobacco Use    Smoking status: Former       Packs/day: 1.00       Years: 30.00       Additional pack years: 0.00       Total pack years: 30.00       Types: Cigarettes       Quit date:        Years since quittin.1    Smokeless tobacco: Never   Vaping Use    Vaping Use: Never used   Substance and Sexual Activity    Alcohol use: Yes       Comment: special occasions    Drug use: Never    Sexual activity: Not Currently   Other Topics Concern    Not on file   Social History Narrative    Not on file      Social Determinants of Health      Financial Resource Strain: Not on file   Food Insecurity: Not on file   Transportation Needs: Not on file   Physical Activity: Not on file   Stress: Not on file   Social Connections: Not on file   Intimate Partner Violence: Not on file   Housing Stability: Not on file         Family History          Family History   Problem Relation Name Age of Onset    Arthritis Mother        Other (CARDIAC DISORDER) Mother        Diabetes Mother        Hypertension Mother        Diabetes Father        Diabetes Sister        Diabetes Brother        Diabetes Sibling             Surgical History         Past Surgical History:   Procedure Laterality Date    CARPAL TUNNEL RELEASE Bilateral 2017     Neuroplasty Decompression Median Nerve At Carpal Tunnel    CATARACT EXTRACTION   2017      Cataract Surgery    COLONOSCOPY   02/27/2019     Complete Colonoscopy            REVIEW OF SYSTEMS  DERM:   + as noted in HPI.         Physical examination:   On General Observation: Patient is a pleasant, cooperative, well developed 89 y.o. diabetic   adult male. The patient is alert and oriented to time, place and person. Patient has normal affect and mood.  /80      Vascular:  DP and PT pulses are 2/4 b/l.  mild edema noted. mild varicosities b/l.  CFT  5 seconds to all digits bilateral.  Skin temperature is warm to warm from proximal to distal bilateral.       Muscular: Strength is 5/5 for all instrinsic and extrinsic muscle groups.      Neuro:  Proprioception present.   Sensation to vibration is  intact. Protective sensation present  at all pedal sites via [place] [employer] 5.07 monofilament bilateral.  Light touch intact bilateral.      Derm:    Left toenails: 1-5 Brittleness, crumbling upon debridement, subungual debris, elongation, mycotic appearance, tenderness, and thickness.   Right toenails: 1-5 Brittleness, crumbling upon debridement, subungual debris, elongation, mycotic appearance, tenderness, and thickness.   Hair growth is decreased b/l le     ASSESSMENT:    Tinea Unguium [B35.1]   E11.9  Pain in right toe(s) [M79.674]   Pain in left toe(s) [M79.675]         PLAN:   DM foot care and DM foot manifestations were reviewed.  The patient was educated on clinical findings, diagnosis and treatment plans. Patient understands all that has been explained and all questions were answered to apparent satisfaction.      - Debrided toenails 1-10 in length and height.   - Follow up in 9-12 weeks.

## 2024-06-17 ENCOUNTER — APPOINTMENT (OUTPATIENT)
Dept: PRIMARY CARE | Facility: CLINIC | Age: 89
End: 2024-06-17
Payer: MEDICARE

## 2024-06-17 VITALS
WEIGHT: 174.2 LBS | DIASTOLIC BLOOD PRESSURE: 60 MMHG | SYSTOLIC BLOOD PRESSURE: 145 MMHG | HEART RATE: 68 BPM | BODY MASS INDEX: 29.9 KG/M2

## 2024-06-17 DIAGNOSIS — E78.2 MIXED HYPERLIPIDEMIA: ICD-10-CM

## 2024-06-17 DIAGNOSIS — I10 PRIMARY HYPERTENSION: ICD-10-CM

## 2024-06-17 DIAGNOSIS — N18.9 CHRONIC KIDNEY DISEASE, UNSPECIFIED CKD STAGE: Primary | ICD-10-CM

## 2024-06-17 DIAGNOSIS — E11.9 DIABETES MELLITUS WITHOUT COMPLICATION (MULTI): ICD-10-CM

## 2024-06-17 DIAGNOSIS — E11.42 DIABETIC POLYNEUROPATHY ASSOCIATED WITH TYPE 2 DIABETES MELLITUS (MULTI): ICD-10-CM

## 2024-06-17 DIAGNOSIS — N28.89 KIDNEY MASS: ICD-10-CM

## 2024-06-17 DIAGNOSIS — N18.32 STAGE 3B CHRONIC KIDNEY DISEASE (MULTI): ICD-10-CM

## 2024-06-17 PROBLEM — E66.811 CLASS 1 OBESITY WITHOUT SERIOUS COMORBIDITY WITH BODY MASS INDEX (BMI) OF 30.0 TO 30.9 IN ADULT: Status: RESOLVED | Noted: 2024-03-08 | Resolved: 2024-06-17

## 2024-06-17 PROBLEM — E66.9 CLASS 1 OBESITY WITHOUT SERIOUS COMORBIDITY WITH BODY MASS INDEX (BMI) OF 30.0 TO 30.9 IN ADULT: Status: RESOLVED | Noted: 2024-03-08 | Resolved: 2024-06-17

## 2024-06-17 LAB — POC HEMOGLOBIN A1C: 6.2 % (ref 4.2–6.5)

## 2024-06-17 PROCEDURE — 3077F SYST BP >= 140 MM HG: CPT | Performed by: INTERNAL MEDICINE

## 2024-06-17 PROCEDURE — 3078F DIAST BP <80 MM HG: CPT | Performed by: INTERNAL MEDICINE

## 2024-06-17 PROCEDURE — 1036F TOBACCO NON-USER: CPT | Performed by: INTERNAL MEDICINE

## 2024-06-17 PROCEDURE — 1159F MED LIST DOCD IN RCRD: CPT | Performed by: INTERNAL MEDICINE

## 2024-06-17 PROCEDURE — 1157F ADVNC CARE PLAN IN RCRD: CPT | Performed by: INTERNAL MEDICINE

## 2024-06-17 PROCEDURE — 1160F RVW MEDS BY RX/DR IN RCRD: CPT | Performed by: INTERNAL MEDICINE

## 2024-06-17 PROCEDURE — 83036 HEMOGLOBIN GLYCOSYLATED A1C: CPT | Performed by: INTERNAL MEDICINE

## 2024-06-17 PROCEDURE — 99214 OFFICE O/P EST MOD 30 MIN: CPT | Performed by: INTERNAL MEDICINE

## 2024-06-17 RX ORDER — GLIMEPIRIDE 1 MG/1
0.5 TABLET ORAL
Start: 2024-06-17 | End: 2025-07-22

## 2024-06-17 ASSESSMENT — ENCOUNTER SYMPTOMS
COUGH: 0
LIGHT-HEADEDNESS: 1
CHILLS: 0
UNEXPECTED WEIGHT CHANGE: 0
CONSTIPATION: 0
FATIGUE: 0
APPETITE CHANGE: 1
HEADACHES: 0
ABDOMINAL PAIN: 0
FREQUENCY: 0
ACTIVITY CHANGE: 0
DIFFICULTY URINATING: 0
VOMITING: 0
WHEEZING: 0
SHORTNESS OF BREATH: 0
NAUSEA: 0
FEVER: 0
FLANK PAIN: 0
PALPITATIONS: 0
DIZZINESS: 0
CHEST TIGHTNESS: 0
DIARRHEA: 0

## 2024-06-17 NOTE — PROGRESS NOTES
Subjective   Patient ID: Nik Washington is a 89 y.o. male who presents for Follow-up (3m).    HPI  Pt here in 3 month follow up.  He is down a few pounds since last visit.  He notes lower appetite.      He saw his nephrologist for his history of CKD.  He wanted him to try to come off Gabapentin as he felt it was cause of his LE edema.  Once he stopped it the swelling did go away.  His BP is high today but he did take it at home earlier and it was better.  He is taking his medications.  He takes his BP at home and got 145/60 earlier today.      He is trying to stay active-goes to a local gym to walk.  He continues to have low back/leg pain that is chronic in nature.      He continues to test his sugars.  Per patient he has low sugars and the pharmacist suggest he reduces dose.   He is now taking 1/4 th of the 2 mg.  His readings are  fasting.  Last A1c was 7.0%.              Review of Systems   Constitutional:  Positive for appetite change. Negative for activity change, chills, fatigue, fever and unexpected weight change.   HENT:  Positive for sneezing.    Respiratory:  Negative for cough, chest tightness, shortness of breath and wheezing.    Cardiovascular:  Negative for chest pain, palpitations and leg swelling.   Gastrointestinal:  Negative for abdominal pain, constipation, diarrhea, nausea and vomiting.   Endocrine:        +DM with some low sugars    Genitourinary:  Negative for difficulty urinating, flank pain and frequency.   Allergic/Immunologic: Positive for environmental allergies.   Neurological:  Positive for light-headedness (when sugars are low). Negative for dizziness and headaches.       Objective   /60 Comment: home  Pulse 68   Wt 79 kg (174 lb 3.2 oz)   BMI 29.90 kg/m²    Physical Exam  Constitutional:       Appearance: Normal appearance.   Cardiovascular:      Rate and Rhythm: Normal rate and regular rhythm.      Heart sounds: Normal heart sounds.   Pulmonary:      Effort:  Pulmonary effort is normal.      Breath sounds: Normal breath sounds.   Abdominal:      General: Bowel sounds are normal.      Palpations: Abdomen is soft.   Musculoskeletal:      Right lower leg: Edema (2+ pitting edema noted) present.      Left lower leg: Edema (2+ pitting edema noted) present.   Lymphadenopathy:      Cervical: No cervical adenopathy.   Neurological:      Mental Status: He is alert and oriented to person, place, and time.   Psychiatric:         Mood and Affect: Mood normal.         Assessment/Plan   Problem List Items Addressed This Visit       Chronic kidney disease - Primary     Sees nephrology regularly          Diabetic polyneuropathy associated with type 2 diabetes mellitus (Multi)    Hyperlipidemia     On statin therapy          Hypertension     BP a bit high today  Sees nephrologist   On Amlodipine only   Has LE edema          Kidney mass     Follows with urology  Just doing surveillance          Diabetes mellitus without complication (Multi)     A1c has improved and is under 7%  He is taking 0.5 mg of Glimepiride and will use up current amount he has           Relevant Medications    glimepiride (AmaryL) 1 mg tablet    Other Relevant Orders    POCT glycosylated hemoglobin (Hb A1C) manually resulted (Completed)

## 2024-06-17 NOTE — PATIENT INSTRUCTIONS
Continue to see specialist as directed  We did your A1c today and it was -it was 6.2% which is very good   Continue all medications as directed-for now continue 1/4 tablet of the 2 mg Glimepiride you have to use them all up (0.5 mg/day)  Continue healthy diet and be as active as able  Follow up here in 3 months-sooner if needed for full physical

## 2024-06-17 NOTE — ASSESSMENT & PLAN NOTE
A1c has improved and is under 7%  He is taking 0.5 mg of Glimepiride and will use up current amount he has

## 2024-07-14 DIAGNOSIS — N40.1 BENIGN PROSTATIC HYPERPLASIA WITH LOWER URINARY TRACT SYMPTOMS, SYMPTOM DETAILS UNSPECIFIED: ICD-10-CM

## 2024-07-15 RX ORDER — TERAZOSIN 5 MG/1
5 CAPSULE ORAL DAILY
Qty: 90 CAPSULE | Refills: 1 | Status: SHIPPED | OUTPATIENT
Start: 2024-07-15

## 2024-07-23 DIAGNOSIS — E78.5 HYPERLIPIDEMIA, UNSPECIFIED HYPERLIPIDEMIA TYPE: ICD-10-CM

## 2024-07-23 RX ORDER — SIMVASTATIN 40 MG/1
40 TABLET, FILM COATED ORAL DAILY
Qty: 90 TABLET | Refills: 1 | Status: SHIPPED | OUTPATIENT
Start: 2024-07-23

## 2024-07-29 ENCOUNTER — APPOINTMENT (OUTPATIENT)
Dept: ORTHOPEDIC SURGERY | Facility: CLINIC | Age: 89
End: 2024-07-29
Payer: MEDICARE

## 2024-07-29 VITALS — WEIGHT: 174 LBS | BODY MASS INDEX: 30.83 KG/M2 | HEIGHT: 63 IN

## 2024-07-29 DIAGNOSIS — R27.0 ATAXIA: ICD-10-CM

## 2024-07-29 DIAGNOSIS — M48.062 SPINAL STENOSIS, LUMBAR REGION, WITH NEUROGENIC CLAUDICATION: Primary | ICD-10-CM

## 2024-07-29 PROCEDURE — 99204 OFFICE O/P NEW MOD 45 MIN: CPT | Performed by: PHYSICIAN ASSISTANT

## 2024-07-29 PROCEDURE — 1036F TOBACCO NON-USER: CPT | Performed by: PHYSICIAN ASSISTANT

## 2024-07-29 PROCEDURE — 1159F MED LIST DOCD IN RCRD: CPT | Performed by: PHYSICIAN ASSISTANT

## 2024-07-29 PROCEDURE — 1160F RVW MEDS BY RX/DR IN RCRD: CPT | Performed by: PHYSICIAN ASSISTANT

## 2024-07-29 PROCEDURE — 1157F ADVNC CARE PLAN IN RCRD: CPT | Performed by: PHYSICIAN ASSISTANT

## 2024-07-29 ASSESSMENT — PAIN - FUNCTIONAL ASSESSMENT: PAIN_FUNCTIONAL_ASSESSMENT: 0-10

## 2024-07-29 NOTE — PROGRESS NOTES
Nik is an 89-year-old male reporting clinic today for evaluation of his lumbar stenosis.    He has had symptoms for several years, he denies injury or trauma.  He has low back pain that radiates down his legs bilaterally, left worse than right.  He also has symptoms of neurogenic claudication.  Recently he is having increased difficulty walking and has noticed a significant decline in his balance.  He does have some minor intermittent neck pain.  No pain radiating down his arms.  He denies weakness, dragging or tripping over his feet.  No recent change in bowel or bladder function.    Previous treatment has consisted of injections with pain management, most recently was in 2021, injections only lasted a month.  He takes Tylenol 1000 mg 3 times daily.    He has a history of renal cell carcinoma, he is monitored by nephrology.  No known history of metastases.  Family, social, and medical histories are obtained and reviewed.    ROS: All other systems have been reviewed and are negative except as previously noted in history of present illness.    Physical Exam:  Const: Well-appearing, well-nourished male in no distress.  Eyes: Normal appearing sclera and conjunctiva, no jaundice, pupils normal in appearance.  Neck: No masses or lymphadenopathy appreciated.  Resp: breathing comfortably, normal respiratory rate rate.  CV: No upper or lower extremity edema.  Musculoskeletal: Slow deliberate gait.  Unsteady tandem gait.  Lumbar ROM is supple.  Strength exam of the lower extremities reveals 5/5 strength in all major muscle groups.  No intrinsic wasting.  Negative straight leg raise bilaterally.  Neuro: Sensation is intact and equal bilaterally. Deep tendon reflexes are normal and symmetric.  No clonus, negative Calderon sign.  Skin: Intact without any lesions, normal turgor.  Psych: Alert and oriented x3, normal mood and affect.    His MRI from 2021 showed evidence of worsening lumbar stenosis.  There is also evidence of  multilevel spondylolisthesis.    The plan moving forward is to further evaluate his progressive lumbar stenosis with new onset of an ataxic gait.  MRIs of the cervical, thoracic, and lumbar spine were ordered today.  Cervical and thoracic imaging is medically necessary to assess for pressure on the spinal cord with an unsteady tandem gait and cancer history.  He was also given a referral to physical therapy, he is interested in adding to his routine exercise program.  He will follow-up with me once his imaging is complete.    **This note was dictated using speech recognition software and was not corrected for spelling or grammatical errors**

## 2024-08-02 ENCOUNTER — APPOINTMENT (OUTPATIENT)
Dept: PODIATRY | Facility: CLINIC | Age: 89
End: 2024-08-02
Payer: MEDICARE

## 2024-08-02 DIAGNOSIS — M79.675 TOE PAIN, LEFT: ICD-10-CM

## 2024-08-02 DIAGNOSIS — B35.1 TINEA UNGUIUM: ICD-10-CM

## 2024-08-02 DIAGNOSIS — I89.0 LYMPHEDEMA: ICD-10-CM

## 2024-08-02 DIAGNOSIS — E11.9 DIABETES MELLITUS WITHOUT COMPLICATION (MULTI): ICD-10-CM

## 2024-08-02 DIAGNOSIS — M79.674 TOE PAIN, RIGHT: Primary | ICD-10-CM

## 2024-08-02 PROCEDURE — 1036F TOBACCO NON-USER: CPT | Performed by: PODIATRIST

## 2024-08-02 PROCEDURE — 1159F MED LIST DOCD IN RCRD: CPT | Performed by: PODIATRIST

## 2024-08-02 PROCEDURE — 1157F ADVNC CARE PLAN IN RCRD: CPT | Performed by: PODIATRIST

## 2024-08-02 PROCEDURE — 99212 OFFICE O/P EST SF 10 MIN: CPT | Performed by: PODIATRIST

## 2024-08-02 PROCEDURE — 1160F RVW MEDS BY RX/DR IN RCRD: CPT | Performed by: PODIATRIST

## 2024-08-02 PROCEDURE — 11721 DEBRIDE NAIL 6 OR MORE: CPT | Performed by: PODIATRIST

## 2024-08-02 NOTE — PROGRESS NOTES
CC:  painful thickened and elongated toenails and diabetic care.      HPI: Pt presents for dm foot exam and painful thickened and elongated toenails that are difficult to manage.  Onset was gradual with worsening course until recently.  Aggravated by shoe gear and ambulation. Had some issues with swelling feet. Wears compression hose.        PCP: Dr. Shultz  Last visit: 6-7-24     PMH  Medical History           Past Medical History:   Diagnosis Date    Elevated prostate specific antigen (PSA) 02/23/2021     High prostate specific antigen (PSA)    Encounter for general adult medical examination without abnormal findings 09/29/2021     Medicare annual wellness visit, subsequent    Other conditions influencing health status 05/26/2021     Diabetes mellitus type 2, uncontrolled    Other specified symptoms and signs involving the circulatory and respiratory systems       Abdominal bruit    Personal history of other diseases of the nervous system and sense organs 12/01/2017     History of carpal tunnel syndrome    Personal history of other drug therapy 09/29/2021     History of influenza vaccination    Personal history of other endocrine, nutritional and metabolic disease       History of hyperkalemia    Personal history of other malignant neoplasm of skin       History of malignant neoplasm of skin         MEDS     Current Outpatient Medications:     acetaminophen (Tylenol) 500 mg capsule, Take 2 capsules (1,000 mg) by mouth every 6 hours if needed., Disp: , Rfl:     amLODIPine (Norvasc) 5 mg tablet, Take 1 tablet (5 mg) by mouth once daily., Disp: , Rfl:     ammonium lactate (Amlactin) 12 % cream, Apply topically if needed for dry skin. APPLY PER DIRECTED, Disp: , Rfl:     aspirin 81 mg EC tablet, Take 1 tablet (81 mg) by mouth once daily., Disp: , Rfl:     cholecalciferol (Vitamin D-3) 25 MCG (1000 UT) capsule, Take by mouth., Disp: , Rfl:     diclofenac sodium (Voltaren) 1 % gel gel, PER DIRECTED TRANSDERMAL, Disp: ,  Rfl:     gabapentin (Neurontin) 300 mg capsule, Take 1 capsule (300 mg) by mouth once daily., Disp: 90 capsule, Rfl: 1    simvastatin (Zocor) 40 mg tablet, TAKE ONE TABLET BY MOUTH EVERY DAY, Disp: 90 tablet, Rfl: 1    terazosin (Hytrin) 5 mg capsule, TAKE ONE CAPSULE BY MOUTH EVERY DAY, Disp: 90 capsule, Rfl: 1  Allergies  No Known Allergies  Social History   Social History                Socioeconomic History    Marital status:        Spouse name: Not on file    Number of children: 2    Years of education: Not on file    Highest education level: Not on file   Occupational History    Occupation: retired   Tobacco Use    Smoking status: Former       Packs/day: 1.00       Years: 30.00       Additional pack years: 0.00       Total pack years: 30.00       Types: Cigarettes       Quit date:        Years since quittin.1    Smokeless tobacco: Never   Vaping Use    Vaping Use: Never used   Substance and Sexual Activity    Alcohol use: Yes       Comment: special occasions    Drug use: Never    Sexual activity: Not Currently   Other Topics Concern    Not on file   Social History Narrative    Not on file      Social Determinants of Health      Financial Resource Strain: Not on file   Food Insecurity: Not on file   Transportation Needs: Not on file   Physical Activity: Not on file   Stress: Not on file   Social Connections: Not on file   Intimate Partner Violence: Not on file   Housing Stability: Not on file         Family History               Family History   Problem Relation Name Age of Onset    Arthritis Mother        Other (CARDIAC DISORDER) Mother        Diabetes Mother        Hypertension Mother        Diabetes Father        Diabetes Sister        Diabetes Brother        Diabetes Sibling             Surgical History             Past Surgical History:   Procedure Laterality Date    CARPAL TUNNEL RELEASE Bilateral 2017     Neuroplasty Decompression Median Nerve At Carpal Tunnel    CATARACT EXTRACTION    03/07/2017     Cataract Surgery    COLONOSCOPY   02/27/2019     Complete Colonoscopy            REVIEW OF SYSTEMS  DERM:   + as noted in HPI.         Physical examination:   On General Observation: Patient is a pleasant, cooperative, well developed 89 y.o. diabetic   adult male. The patient is alert and oriented to time, place and person. Patient has normal affect and mood.  /80      Vascular:  DP and PT pulses are 2/4 b/l.  mild edema noted. mild varicosities b/l.  CFT  5 seconds to all digits bilateral.  Skin temperature is warm to warm from proximal to distal bilateral.       Muscular: Strength is 5/5 for all instrinsic and extrinsic muscle groups.      Neuro:  Proprioception present.   Sensation to vibration is  intact. Protective sensation present  at all pedal sites via [place] [employer] 5.07 monofilament bilateral.  Light touch intact bilateral.      Derm:    Left toenails: 1-5 Brittleness, crumbling upon debridement, subungual debris, elongation, mycotic appearance, tenderness, and thickness.   Right toenails: 1-5 Brittleness, crumbling upon debridement, subungual debris, elongation, mycotic appearance, tenderness, and thickness.   Hair growth is decreased b/l le     ASSESSMENT:    Lymphedema  Tinea Unguium [B35.1]   E11.9  Pain in right toe(s) [M79.674]   Pain in left toe(s) [M79.675]         PLAN:   Exam  Lymphedema is present left le, recommend compression hose and elevation.  DM foot care and DM foot manifestations were reviewed.  The patient was educated on clinical findings, diagnosis and treatment plans. Patient understands all that has been explained and all questions were answered to apparent satisfaction.      - Debrided toenails 1-10 in length and height.   - Follow up in 9-12 weeks.

## 2024-08-16 ENCOUNTER — HOSPITAL ENCOUNTER (OUTPATIENT)
Dept: RADIOLOGY | Facility: CLINIC | Age: 89
Discharge: HOME | End: 2024-08-16
Payer: MEDICARE

## 2024-08-16 DIAGNOSIS — R27.0 ATAXIA: ICD-10-CM

## 2024-08-16 DIAGNOSIS — M48.062 SPINAL STENOSIS, LUMBAR REGION, WITH NEUROGENIC CLAUDICATION: ICD-10-CM

## 2024-08-16 PROCEDURE — 72148 MRI LUMBAR SPINE W/O DYE: CPT

## 2024-08-16 PROCEDURE — 72146 MRI CHEST SPINE W/O DYE: CPT

## 2024-08-16 PROCEDURE — 72141 MRI NECK SPINE W/O DYE: CPT

## 2024-08-21 ENCOUNTER — HOSPITAL ENCOUNTER (OUTPATIENT)
Dept: RADIOLOGY | Facility: CLINIC | Age: 89
Discharge: HOME | End: 2024-08-21
Payer: MEDICARE

## 2024-08-21 DIAGNOSIS — N28.89 OTHER SPECIFIED DISORDERS OF KIDNEY AND URETER: ICD-10-CM

## 2024-08-21 PROCEDURE — 74176 CT ABD & PELVIS W/O CONTRAST: CPT

## 2024-08-29 PROBLEM — D64.9 ANEMIA: Status: ACTIVE | Noted: 2024-08-29

## 2024-08-29 PROBLEM — M47.817 SPONDYLOSIS OF LUMBOSACRAL SPINE WITHOUT MYELOPATHY: Status: ACTIVE | Noted: 2024-08-29

## 2024-08-29 PROBLEM — R09.89 ABDOMINAL BRUIT: Status: ACTIVE | Noted: 2024-08-29

## 2024-08-29 PROBLEM — Z85.828 HISTORY OF MALIGNANT NEOPLASM OF SKIN: Status: ACTIVE | Noted: 2024-08-29

## 2024-08-29 PROBLEM — R00.1 BRADYCARDIA: Status: RESOLVED | Noted: 2023-10-26 | Resolved: 2024-08-29

## 2024-09-03 ENCOUNTER — LAB (OUTPATIENT)
Dept: LAB | Facility: LAB | Age: 89
End: 2024-09-03
Payer: MEDICARE

## 2024-09-03 DIAGNOSIS — N18.32 CHRONIC KIDNEY DISEASE, STAGE 3B (MULTI): Primary | ICD-10-CM

## 2024-09-03 LAB
25(OH)D3 SERPL-MCNC: 37 NG/ML (ref 30–100)
ALBUMIN SERPL BCP-MCNC: 4.3 G/DL (ref 3.4–5)
ANION GAP SERPL CALC-SCNC: 15 MMOL/L (ref 10–20)
BASOPHILS # BLD AUTO: 0.06 X10*3/UL (ref 0–0.1)
BASOPHILS NFR BLD AUTO: 0.9 %
BUN SERPL-MCNC: 33 MG/DL (ref 6–23)
CALCIUM SERPL-MCNC: 9 MG/DL (ref 8.6–10.6)
CHLORIDE SERPL-SCNC: 105 MMOL/L (ref 98–107)
CO2 SERPL-SCNC: 25 MMOL/L (ref 21–32)
CREAT SERPL-MCNC: 1.98 MG/DL (ref 0.5–1.3)
CREAT UR-MCNC: 112.8 MG/DL (ref 20–370)
EGFRCR SERPLBLD CKD-EPI 2021: 32 ML/MIN/1.73M*2
EOSINOPHIL # BLD AUTO: 0.53 X10*3/UL (ref 0–0.4)
EOSINOPHIL NFR BLD AUTO: 7.8 %
ERYTHROCYTE [DISTWIDTH] IN BLOOD BY AUTOMATED COUNT: 13.2 % (ref 11.5–14.5)
GLUCOSE SERPL-MCNC: 104 MG/DL (ref 74–99)
HCT VFR BLD AUTO: 35.3 % (ref 41–52)
HGB BLD-MCNC: 11.5 G/DL (ref 13.5–17.5)
IMM GRANULOCYTES # BLD AUTO: 0.03 X10*3/UL (ref 0–0.5)
IMM GRANULOCYTES NFR BLD AUTO: 0.4 % (ref 0–0.9)
LYMPHOCYTES # BLD AUTO: 1.37 X10*3/UL (ref 0.8–3)
LYMPHOCYTES NFR BLD AUTO: 20.2 %
MCH RBC QN AUTO: 31.3 PG (ref 26–34)
MCHC RBC AUTO-ENTMCNC: 32.6 G/DL (ref 32–36)
MCV RBC AUTO: 96 FL (ref 80–100)
MICROALBUMIN UR-MCNC: 183.4 MG/L
MICROALBUMIN/CREAT UR: 162.6 UG/MG CREAT
MONOCYTES # BLD AUTO: 0.69 X10*3/UL (ref 0.05–0.8)
MONOCYTES NFR BLD AUTO: 10.2 %
NEUTROPHILS # BLD AUTO: 4.1 X10*3/UL (ref 1.6–5.5)
NEUTROPHILS NFR BLD AUTO: 60.5 %
NRBC BLD-RTO: 0 /100 WBCS (ref 0–0)
PHOSPHATE SERPL-MCNC: 3.9 MG/DL (ref 2.5–4.9)
PLATELET # BLD AUTO: 206 X10*3/UL (ref 150–450)
POTASSIUM SERPL-SCNC: 5 MMOL/L (ref 3.5–5.3)
PTH-INTACT SERPL-MCNC: 125.2 PG/ML (ref 18.5–88)
RBC # BLD AUTO: 3.67 X10*6/UL (ref 4.5–5.9)
SODIUM SERPL-SCNC: 140 MMOL/L (ref 136–145)
URATE SERPL-MCNC: 5.7 MG/DL (ref 4–7.5)
WBC # BLD AUTO: 6.8 X10*3/UL (ref 4.4–11.3)

## 2024-09-03 PROCEDURE — 85025 COMPLETE CBC W/AUTO DIFF WBC: CPT

## 2024-09-03 PROCEDURE — 82043 UR ALBUMIN QUANTITATIVE: CPT

## 2024-09-03 PROCEDURE — 80069 RENAL FUNCTION PANEL: CPT

## 2024-09-03 PROCEDURE — 82570 ASSAY OF URINE CREATININE: CPT

## 2024-09-03 PROCEDURE — 84550 ASSAY OF BLOOD/URIC ACID: CPT

## 2024-09-03 PROCEDURE — 83970 ASSAY OF PARATHORMONE: CPT

## 2024-09-03 PROCEDURE — 82306 VITAMIN D 25 HYDROXY: CPT

## 2024-09-03 PROCEDURE — 36415 COLL VENOUS BLD VENIPUNCTURE: CPT

## 2024-09-04 ENCOUNTER — EVALUATION (OUTPATIENT)
Dept: PHYSICAL THERAPY | Facility: CLINIC | Age: 89
End: 2024-09-04
Payer: MEDICARE

## 2024-09-04 DIAGNOSIS — R27.0 ATAXIA: ICD-10-CM

## 2024-09-04 DIAGNOSIS — M48.062 SPINAL STENOSIS, LUMBAR REGION, WITH NEUROGENIC CLAUDICATION: ICD-10-CM

## 2024-09-04 PROCEDURE — 97110 THERAPEUTIC EXERCISES: CPT | Mod: GP

## 2024-09-04 PROCEDURE — 97161 PT EVAL LOW COMPLEX 20 MIN: CPT | Mod: GP

## 2024-09-04 ASSESSMENT — ENCOUNTER SYMPTOMS
OCCASIONAL FEELINGS OF UNSTEADINESS: 1
LOSS OF SENSATION IN FEET: 1
DEPRESSION: 0

## 2024-09-04 NOTE — PROGRESS NOTES
HPI:    Nik Washington is a 89 y.o. male referred by Dr. Huffman for renal mass. Diagnosed incidentally on MRI L spine. Follow-up US revealed a 5 cm L renal mass now here s/p MRI kidney showing bilateral central masses and small pulmonary nodules. CT CAP w/o contrast (2/1/22) with stable size of bilateral renal masses, no evidence of metastatic disease within the chest, abdomen or pelvis, numerous tiny nonspecific pulmonary nodules with slight apical predominance measuring up to 0.3 cm, stable. CT CAP w/o Contrast (8/8/22) with small scattered subcentimeter lung nodules redemonstrated unchanged in appearance, mild patchy bibasilar infiltrates and/or atelectasis, unchanged 1.3 cm lucent lesion superior endplate T12, grossly unchanged heterogeneous bilateral renal masses, colonic diverticulosis most notably sigmoid colon with mild wall thickening versus incomplete distention of the sigmoid colon and mild thickened appearance distal colon, enlarged prostate gland, mild nonspecific urinary bladder wall thickening, sclerotic changes at the left sacrum about the SI joint may be sequela of sacroiliitis among other etiologies but overall grossly similar to prior. Seen by nephrology for stage 3 chronic kidney disease. CT CAP (8/18/23) showed chest, numerous nonspecific pulmonary nodules stable from prior CT dated 8/8/22, no new or suspicious pulmonary nodules, bilateral renal masses are grossly stable in size compared to the prior CT from 8/8/22, no evidence of lymphadenopathy or metastatic disease in the abdomen or pelvis. CT CAP (8/21/24) showed chest DANIEL, AP no definite new metastatic disease identified on noncontrast exam, grossly bilateral renal masses are stable, difficult to characterize without contrast. Reports balance changes, patient concerned about his fall risk. Patient is back to normal activity and is exercising regularly. Good urinary function. Denies hematuria.      Past smoker of 20 years, ceased  smoking approximately 50 years ago. Tries to exercise regularly.     Cre: 1.98 (9/3/24), 2.08 (7/27/23), 2.17 (8/1/22)     CT chest: tiny pulmonary nodules.  UA: no blood  Cytology: DANIEL  MRI Kidney 10/11/21: Bilateral solid enhancing masses. 5 cm on the L and 4 cm on the R. No vein or IVc involvement.      CT CAP wo contrast (2/1/22): Renal cell carcinoma, restaging scan. When compared to most recent prior MRI kidney and CT chest, there is stable size of bilateral renal masses given differences in technique and limited evaluation on this noncontrast examination. Otherwise, no evidence of metastatic disease within the chest, abdomen or pelvis. Numerous tiny nonspecific pulmonary nodules with slight apical predominance measuring up to 0.3 cm, stable when compared to most recent CT chest dated 10/01/2021. Prostatomegaly. Correlate with serum PSA.     CT CAP wo Contrast (8/8/22): Small scattered subcentimeter lung nodules redemonstrated unchanged in appearance. Mild patchy bibasilar infiltrates and/or atelectasis. Unchanged 1.3 cm lucent lesion superior endplate T12. Grossly unchanged heterogeneous bilateral renal masses. Colonic diverticulosis most notably sigmoid colon with mild wall thickening versus incomplete distention of the sigmoid colon and mild thickened appearance distal colon. Enlarged prostate gland. Mild nonspecific urinary bladder wall thickening. Sclerotic changes at the left sacrum about the SI joint may be sequela of sacroiliitis among other etiologies but overall grossly similar to prior.     CT CAP (8/18/23): chest, numerous nonspecific pulmonary nodules stable from prior CT dated 08/08/2022, no new or suspicious pulmonary nodules, bilateral renal masses are grossly stable in size compared to the prior CT from 08/08/2022, no evidence of lymphadenopathy or metastatic disease in the abdomen or pelvis     CT CAP (8/21/24): chest DANIEL, AP no definite new metastatic disease identified on noncontrast exam,  grossly bilateral renal masses are stable, difficult to characterize without contrast.    Review of Systems:  All systems reviewed. Anything negative noted in the HPI.    Physical Exam:  Vitals signs reviewed.  Constitutional:      Appearance: Well-developed.  HENT:     Head: Normocephalic and atraumatic.  Neck:     Musculoskeletal: Normal range of motion.  Pulmonary:     Effort: Pulmonary effort is normal.  Musculoskeletal: Normal range of motion.  Skin:     General: Skin is warm and dry.  Neurological:     Mental Status: Alert and oriented to person, place, and time.  Psychiatric:        Behavior: Behavior normal.        Thought Content: Thought content normal.        Judgment: Judgment normal.    Procedures:    Assessment/Plan   Nik Washington is a 89 y.o. male referred by Dr. Huffman for renal mass. CT CAP w/o Contrast (8/8/22) with small scattered subcentimeter lung nodules redemonstrated unchanged in appearance, mild patchy bibasilar infiltrates and/or atelectasis, unchanged 1.3 cm lucent lesion superior endplate T12, grossly unchanged heterogeneous bilateral renal masses, colonic diverticulosis most notably sigmoid colon with mild wall thickening versus incomplete distention of the sigmoid colon and mild thickened appearance distal colon, enlarged prostate gland, mild nonspecific urinary bladder wall thickening, sclerotic changes at the left sacrum about the SI joint may be sequela of sacroiliitis among other etiologies but overall grossly similar to prior. Seen by nephrology for stage 3 chronic kidney disease. CT CAP (8/18/23) showed chest, numerous nonspecific pulmonary nodules stable from prior CT dated 8/8/22, no new or suspicious pulmonary nodules, bilateral renal masses are grossly stable in size compared to the prior CT from 8/8/22, no evidence of lymphadenopathy or metastatic disease in the abdomen or pelvis. CT CAP (8/21/24) showed chest DANIEL, AP no definite new metastatic disease  identified on noncontrast exam, grossly bilateral renal masses are stable, difficult to characterize without contrast. Reports balance changes, patient concerned about his fall risk. Patient is back to normal activity and is exercising regularly. Good urinary function. Denies hematuria. Management options including risks, benefits and alternatives discussed at length and all questions answered. Patient prefers to proceed with follow-up in 1 year with BMP and CT CAP.             Scribe Attestation:  By signing my name below, IMireille Scribe   attest that this documentation has been prepared under the direction and in the presence of Nirmal Moreno MD.

## 2024-09-04 NOTE — PROGRESS NOTES
Physical Therapy Evaluation    Subjective:  Patient Name: Nik Washington  MRN: [id number]  Today's Date: 2024  Visit: 1/MN  Referred by: Kira Seth  Time Calculation  Start Time: 1000  Stop Time: 1045  Time Calculation (min): 45 min  Diagnosis: lumbar stenosis  Mechanism of Injury:  Patient with LBP for several years.  Radiates down bilat LEs (L>R).  Also with sx of neurogenic claudication.  Reports increasing imbalance recently which is his main concern.  Location: across LB and down posterior legs to knees.  Pain Ratin/10-5/10  Increased pain: walking long distances, sleeping (prefers s/l), if is sedentary too long  Reports mild tingling in toes bilat feet, dx with neuropathy  Decreased pain: acetaminophen, heat  PMH: h/o renal cell carcinoma    Current Medical Management:  -has had injections with pain mgmt (last )  -MRI lumbar spine= 14 mm anterolisthesis L5/S1 with bilat L5 pars defects, 3 mm anterolisthesis L3/4 and 2 mm on L1/2, varying degrees of spinal canal and neural foraminal narrowing in lumbar spine  Precautions: imbalance  Functional Limitations: has limited volunteer work due to imbalance  Prior Level of Function: goes to gym 3x/week- does light wt training, bike.  Walks for exercise- 1-2 miles.  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[employer], food stokes)  Living Environment: 2 story condo- difficulty descending due to imbalance  Social Support: lives alone  Personal Factors that may impact care: chronicity of LBP    Objective:    Gait: Amb without device, fwd trunk flexion  Stairs: able to ascend/descend stairs with minimal use of handrail    Lumbar ROM:  Flexion= WNL  Extension= unable to reach neutral lumbar, lacks extension but no pain  Sidebend= WNL  Rotation= WNL    LE Strength (R/L):  Hip flexion= 5/5  Hip abduction= 5/5 in hooklying  Hip extension= pain with bridging  Knee flexion= 5/5  Knee extension= 5/5  Ankle DF= 5/5    LE ROM/Flexibility (R/L):  Knee flexion=  WNL  Knee extension= WNL  Hip flexion= WNL  Hamstrings= 60/62  HF= mild tightness bilat    Special Tests:  Able to do LTR bilat  Able to do pelvic tilts with mod cues  Oswestry= 26% impaired  OSBORNE= 45/56    Treatment Performed Today:  Instructed in and issued for HEP:  Pelvic tilts, seated hamstring stretch, standing HF stretch, 1/2 tandem balance, and SLS with UE support    Assessment:  88 yo M with chronic LBP due to degenerative changes (stenosis, anterolisthesis).  However, his main concern currently is increasing imbalance.  Would benefit from PT to address both core strength as well as imbalance   . Low complexity due to patient's clinical presentation being stable and uncomplicated by any significant comorbidities that may affect rehab tolerance and progression.    Clinical presentation:  Stable and/or uncomplicated characteristics,   Problem List:  Back pain, imbalance  Level of Complexity:  low  Plan:  -Goals:  Active       PT Problem       Increase OSBORNE balance to at least 48/56 to decrease fall risk.       Start:  09/04/24    Expected End:  12/03/24            Patient to report no more than 3/10 pain       Start:  09/04/24    Expected End:  12/03/24            Reports able to volunteer as desired without fear of falls/imbalance       Start:  09/04/24    Expected End:  12/03/24            Patient to report not limited/fearful of descending stairs due to imbalance       Start:  09/04/24    Expected End:  12/03/24              -Frequency & Duration:   1x/week x 6-8 weeks  -Rehab Potential:   good  Plan of care was developed with input and agreement of the patient.    Billing:  PT Evaluation Time Entry  PT Evaluation (Low) Time Entry: 30  PT Therapeutic Procedures Time Entry  Therapeutic Exercise Time Entry: 15

## 2024-09-05 ENCOUNTER — OFFICE VISIT (OUTPATIENT)
Dept: UROLOGY | Facility: CLINIC | Age: 89
End: 2024-09-05
Payer: MEDICARE

## 2024-09-05 VITALS — WEIGHT: 178 LBS | HEIGHT: 63 IN | BODY MASS INDEX: 31.54 KG/M2

## 2024-09-05 DIAGNOSIS — N28.89 KIDNEY MASS: Primary | ICD-10-CM

## 2024-09-05 PROCEDURE — 1126F AMNT PAIN NOTED NONE PRSNT: CPT | Performed by: STUDENT IN AN ORGANIZED HEALTH CARE EDUCATION/TRAINING PROGRAM

## 2024-09-05 PROCEDURE — G2211 COMPLEX E/M VISIT ADD ON: HCPCS | Performed by: STUDENT IN AN ORGANIZED HEALTH CARE EDUCATION/TRAINING PROGRAM

## 2024-09-05 PROCEDURE — 99213 OFFICE O/P EST LOW 20 MIN: CPT | Performed by: STUDENT IN AN ORGANIZED HEALTH CARE EDUCATION/TRAINING PROGRAM

## 2024-09-05 PROCEDURE — 1159F MED LIST DOCD IN RCRD: CPT | Performed by: STUDENT IN AN ORGANIZED HEALTH CARE EDUCATION/TRAINING PROGRAM

## 2024-09-05 PROCEDURE — 1157F ADVNC CARE PLAN IN RCRD: CPT | Performed by: STUDENT IN AN ORGANIZED HEALTH CARE EDUCATION/TRAINING PROGRAM

## 2024-09-05 ASSESSMENT — PAIN SCALES - GENERAL: PAINLEVEL: 0-NO PAIN

## 2024-09-11 ENCOUNTER — TREATMENT (OUTPATIENT)
Dept: PHYSICAL THERAPY | Facility: CLINIC | Age: 89
End: 2024-09-11
Payer: MEDICARE

## 2024-09-11 DIAGNOSIS — R27.0 ATAXIA: ICD-10-CM

## 2024-09-11 DIAGNOSIS — M48.062 SPINAL STENOSIS, LUMBAR REGION, WITH NEUROGENIC CLAUDICATION: Primary | ICD-10-CM

## 2024-09-11 PROCEDURE — 97112 NEUROMUSCULAR REEDUCATION: CPT | Mod: GP

## 2024-09-11 PROCEDURE — 97110 THERAPEUTIC EXERCISES: CPT | Mod: GP

## 2024-09-11 NOTE — PROGRESS NOTES
Physical Therapy Treatment    Patient Name: Nik Washington  MRN: [id number]  Today's Date: 9/11/2024   Visit 2/MN  Time Calculation  Start Time: 1045  Stop Time: 1130  Time Calculation (min): 45 min  Dx: imbalance (lumbar stenosis)    PRECAUTIONS: imbalance    SUBJECTIVE:  Patient reports no significant change in sx  Compliant with HEP? Yes with difficulty with SLS    OBJECTIVE:  Fwd head and trunk flexion    TREATMENT:  - Therex:  Scifit (LE only) x 5 min  Calf stretch on slantboard x 1 min  8 inch step ups x 15 reps bilat  Standing hamstring stretch x 1 min bilat  Heel and toes raises on 1/2 foam x 20 reps bilat  Wall lift offs    Current HEP:  Pelvic tilts, seated hamstring stretch, standing HF stretch, 1/2 tandem balance, and SLS with UE support.  Added wall lift offs and heel/toe raises    - Neuromuscular Re-education:   Balance Training:  -tandem   -SLS with UE support  -romberg EO, EC  -Rockerboard- AP and LR actively with UE support x 1 min  -alternating LE taps    ASSESSMENT:   Good effort.  Repots at times, feels L foot drags while walking on gravel.  Does demonstrate decreased strength/endurance of L Dfs with 1/2 foam toe raises  EDUCATION:  Continued HEP  PLAN:   Continue to address LE strength and balance     Billing:     PT Therapeutic Procedures Time Entry  Neuromuscular Re-Education Time Entry: 30  Therapeutic Exercise Time Entry: 15

## 2024-09-13 ENCOUNTER — APPOINTMENT (OUTPATIENT)
Dept: PRIMARY CARE | Facility: CLINIC | Age: 89
End: 2024-09-13
Payer: MEDICARE

## 2024-09-13 VITALS
OXYGEN SATURATION: 96 % | HEART RATE: 59 BPM | BODY MASS INDEX: 30.01 KG/M2 | WEIGHT: 175.8 LBS | SYSTOLIC BLOOD PRESSURE: 142 MMHG | HEIGHT: 64 IN | DIASTOLIC BLOOD PRESSURE: 82 MMHG | RESPIRATION RATE: 14 BRPM | TEMPERATURE: 97.9 F

## 2024-09-13 DIAGNOSIS — N18.32 STAGE 3B CHRONIC KIDNEY DISEASE (MULTI): ICD-10-CM

## 2024-09-13 DIAGNOSIS — C64.9 RENAL CELL CARCINOMA, UNSPECIFIED LATERALITY (MULTI): ICD-10-CM

## 2024-09-13 DIAGNOSIS — E11.9 DIABETES MELLITUS WITHOUT COMPLICATION (MULTI): ICD-10-CM

## 2024-09-13 DIAGNOSIS — N40.0 BENIGN PROSTATIC HYPERPLASIA WITHOUT LOWER URINARY TRACT SYMPTOMS: ICD-10-CM

## 2024-09-13 DIAGNOSIS — M48.062 SPINAL STENOSIS, LUMBAR REGION, WITH NEUROGENIC CLAUDICATION: ICD-10-CM

## 2024-09-13 DIAGNOSIS — Z23 NEED FOR INFLUENZA VACCINATION: ICD-10-CM

## 2024-09-13 DIAGNOSIS — Z00.00 MEDICARE ANNUAL WELLNESS VISIT, SUBSEQUENT: Primary | ICD-10-CM

## 2024-09-13 PROCEDURE — G0439 PPPS, SUBSEQ VISIT: HCPCS | Performed by: INTERNAL MEDICINE

## 2024-09-13 PROCEDURE — 1036F TOBACCO NON-USER: CPT | Performed by: INTERNAL MEDICINE

## 2024-09-13 PROCEDURE — G0008 ADMIN INFLUENZA VIRUS VAC: HCPCS | Performed by: INTERNAL MEDICINE

## 2024-09-13 PROCEDURE — 99214 OFFICE O/P EST MOD 30 MIN: CPT | Performed by: INTERNAL MEDICINE

## 2024-09-13 PROCEDURE — 1157F ADVNC CARE PLAN IN RCRD: CPT | Performed by: INTERNAL MEDICINE

## 2024-09-13 PROCEDURE — 3078F DIAST BP <80 MM HG: CPT | Performed by: INTERNAL MEDICINE

## 2024-09-13 PROCEDURE — 1159F MED LIST DOCD IN RCRD: CPT | Performed by: INTERNAL MEDICINE

## 2024-09-13 PROCEDURE — 3077F SYST BP >= 140 MM HG: CPT | Performed by: INTERNAL MEDICINE

## 2024-09-13 PROCEDURE — 1170F FXNL STATUS ASSESSED: CPT | Performed by: INTERNAL MEDICINE

## 2024-09-13 PROCEDURE — 90662 IIV NO PRSV INCREASED AG IM: CPT | Performed by: INTERNAL MEDICINE

## 2024-09-13 PROCEDURE — 1160F RVW MEDS BY RX/DR IN RCRD: CPT | Performed by: INTERNAL MEDICINE

## 2024-09-13 PROCEDURE — 1123F ACP DISCUSS/DSCN MKR DOCD: CPT | Performed by: INTERNAL MEDICINE

## 2024-09-13 PROCEDURE — 1158F ADVNC CARE PLAN TLK DOCD: CPT | Performed by: INTERNAL MEDICINE

## 2024-09-13 RX ORDER — GLIMEPIRIDE 1 MG/1
0.5 TABLET ORAL
Start: 2024-09-13 | End: 2025-10-18

## 2024-09-13 ASSESSMENT — ENCOUNTER SYMPTOMS
CONFUSION: 0
ADENOPATHY: 0
FEVER: 0
NECK PAIN: 0
HALLUCINATIONS: 0
ANAL BLEEDING: 0
SINUS PAIN: 0
OCCASIONAL FEELINGS OF UNSTEADINESS: 1
SPEECH DIFFICULTY: 0
NECK STIFFNESS: 0
RHINORRHEA: 0
DYSPHORIC MOOD: 0
NERVOUS/ANXIOUS: 0
BRUISES/BLEEDS EASILY: 0
LIGHT-HEADEDNESS: 0
DIFFICULTY URINATING: 0
ABDOMINAL PAIN: 0
ARTHRALGIAS: 0
DYSURIA: 0
FACIAL ASYMMETRY: 0
FREQUENCY: 0
DIAPHORESIS: 0
PHOTOPHOBIA: 0
DEPRESSION: 0
VOMITING: 0
EYE REDNESS: 0
APNEA: 0
LOSS OF SENSATION IN FEET: 0
NAUSEA: 0
WOUND: 0
ACTIVITY CHANGE: 0
CHILLS: 0
APPETITE CHANGE: 0
COUGH: 0
SORE THROAT: 0
HEMATURIA: 0
POLYDIPSIA: 0
WHEEZING: 0
SLEEP DISTURBANCE: 0
CHEST TIGHTNESS: 0
SEIZURES: 0
HEADACHES: 0
AGITATION: 0
MYALGIAS: 0
DIZZINESS: 0
RECTAL PAIN: 0
DIARRHEA: 0
COLOR CHANGE: 0
JOINT SWELLING: 0
TREMORS: 0
POLYPHAGIA: 0
STRIDOR: 0
EYE DISCHARGE: 0
SHORTNESS OF BREATH: 0
BLOOD IN STOOL: 0
NUMBNESS: 0
FACIAL SWELLING: 0
TROUBLE SWALLOWING: 0
FLANK PAIN: 0
DECREASED CONCENTRATION: 0
FATIGUE: 0
HYPERACTIVE: 0
ABDOMINAL DISTENTION: 0
CHOKING: 0
SINUS PRESSURE: 0
VOICE CHANGE: 0
EYE ITCHING: 0
UNEXPECTED WEIGHT CHANGE: 0
BACK PAIN: 1
CONSTIPATION: 0
WEAKNESS: 0
PALPITATIONS: 0
EYE PAIN: 0

## 2024-09-13 ASSESSMENT — ACTIVITIES OF DAILY LIVING (ADL)
MANAGING_FINANCES: INDEPENDENT
GROCERY_SHOPPING: INDEPENDENT
TAKING_MEDICATION: INDEPENDENT
DOING_HOUSEWORK: INDEPENDENT
DRESSING: INDEPENDENT
BATHING: INDEPENDENT

## 2024-09-13 ASSESSMENT — PATIENT HEALTH QUESTIONNAIRE - PHQ9
SUM OF ALL RESPONSES TO PHQ9 QUESTIONS 1 AND 2: 0
1. LITTLE INTEREST OR PLEASURE IN DOING THINGS: NOT AT ALL
2. FEELING DOWN, DEPRESSED OR HOPELESS: NOT AT ALL

## 2024-09-13 NOTE — ASSESSMENT & PLAN NOTE
Will order labs to be done when able to fast   Advised to stop the Glimepiride as he is only doing 1/4 tablet   A1C goal is now <8%   Orders:    glimepiride (AmaryL) 1 mg tablet; Take 0.5 tablets (0.5 mg) by mouth once daily in the morning. Take before meals.    Lipid Panel; Future    Hemoglobin A1C; Future    Follow Up In Primary Care - Established; Future

## 2024-09-13 NOTE — ASSESSMENT & PLAN NOTE
Sees urology  Only doing surveillance     Orders:    Hepatic function panel; Future    Follow Up In Primary Care - Established; Future

## 2024-09-13 NOTE — PROGRESS NOTES
Subjective   Reason for Visit: Nik Washington is an 89 y.o. male here for a Medicare Wellness visit.     Past Medical, Surgical, and Family History reviewed and updated in chart.    Reviewed all medications by prescribing practitioner or clinical pharmacist (such as prescriptions, OTCs, herbal therapies and supplements) and documented in the medical record.    HPI  Pt here for MWV.  He continues to remain active.  Appetite is stable.    His back continues to limit him-if he sits too long he feels worse/stiff.  He is taking Tylenol ES TID to help with pain.  He just started with PT and goes 1 time a week to work on balance.      He had blood work 10 days ago and his GFR was 32 at that time.  His hemoglobin is up a bit to 11.5.      He is only taking 1/4 tablet of the Glimepiride.  His last A1C was 7.0% 12/2023.      He sees nephrology and urology-has known renal mass.  He had his gabapentin stopped due to more edema noted in legs and not clear if it was helpful.      Patient Care Team:  Krystyna MARIANO DO as PCP - General (Internal Medicine)  Krystyna MARIANO DO as PCP - [facility] Attributed Provider  Carl Coello MD as Consulting Physician (Cardiology)     Review of Systems   Constitutional:  Negative for activity change, appetite change, chills, diaphoresis, fatigue, fever and unexpected weight change.   HENT:  Negative for congestion, dental problem, drooling, ear discharge, ear pain, facial swelling, hearing loss, mouth sores, nosebleeds, postnasal drip, rhinorrhea, sinus pressure, sinus pain, sneezing, sore throat, tinnitus, trouble swallowing and voice change.    Eyes:  Negative for photophobia, pain, discharge, redness, itching and visual disturbance.   Respiratory:  Negative for apnea, cough, choking, chest tightness, shortness of breath, wheezing and stridor.    Cardiovascular:  Negative for chest pain, palpitations and leg swelling.   Gastrointestinal:  Negative for abdominal distention, abdominal pain,  "anal bleeding, blood in stool, constipation, diarrhea, nausea, rectal pain and vomiting.   Endocrine: Negative for cold intolerance, heat intolerance, polydipsia, polyphagia and polyuria.   Genitourinary:  Negative for decreased urine volume, difficulty urinating, dysuria, enuresis, flank pain, frequency, genital sores, hematuria, penile discharge, penile pain, penile swelling, scrotal swelling, testicular pain and urgency.   Musculoskeletal:  Positive for back pain. Negative for arthralgias, gait problem, joint swelling, myalgias, neck pain and neck stiffness.   Skin:  Negative for color change, pallor, rash and wound.   Allergic/Immunologic: Negative for environmental allergies, food allergies and immunocompromised state.   Neurological:  Negative for dizziness, tremors, seizures, syncope, facial asymmetry, speech difficulty, weakness, light-headedness, numbness and headaches.   Hematological:  Negative for adenopathy. Does not bruise/bleed easily.   Psychiatric/Behavioral:  Negative for agitation, behavioral problems, confusion, decreased concentration, dysphoric mood, hallucinations, self-injury, sleep disturbance and suicidal ideas. The patient is not nervous/anxious and is not hyperactive.        Objective   Vitals:  /82 (BP Location: Left arm, Patient Position: Sitting)   Pulse 59   Temp 36.6 °C (97.9 °F)   Resp 14   Ht 1.632 m (5' 4.25\")   Wt 79.7 kg (175 lb 12.8 oz)   SpO2 96%   BMI 29.94 kg/m²       Physical Exam  Constitutional:       Appearance: Normal appearance.   HENT:      Head: Normocephalic and atraumatic.      Right Ear: Tympanic membrane, ear canal and external ear normal. There is no impacted cerumen.      Left Ear: Tympanic membrane, ear canal and external ear normal. There is no impacted cerumen.      Nose: Nose normal. No congestion or rhinorrhea.      Mouth/Throat:      Mouth: Mucous membranes are moist.      Pharynx: Oropharynx is clear. No oropharyngeal exudate or posterior " oropharyngeal erythema.   Eyes:      Extraocular Movements: Extraocular movements intact.      Conjunctiva/sclera: Conjunctivae normal.      Pupils: Pupils are equal, round, and reactive to light.   Neck:      Vascular: No carotid bruit.   Cardiovascular:      Rate and Rhythm: Normal rate and regular rhythm.      Pulses: Normal pulses.      Heart sounds: Normal heart sounds. No murmur heard.  Pulmonary:      Effort: Pulmonary effort is normal. No respiratory distress.      Breath sounds: Normal breath sounds. No wheezing, rhonchi or rales.   Abdominal:      General: Abdomen is flat. Bowel sounds are normal. There is no distension.      Palpations: Abdomen is soft.      Tenderness: There is no abdominal tenderness.      Hernia: No hernia is present.   Musculoskeletal:         General: No swelling or tenderness. Normal range of motion.      Cervical back: Normal range of motion and neck supple.      Right lower leg: No edema.      Left lower leg: No edema.   Lymphadenopathy:      Cervical: No cervical adenopathy.   Skin:     General: Skin is warm and dry.      Findings: No lesion or rash.   Neurological:      General: No focal deficit present.      Mental Status: He is alert and oriented to person, place, and time.      Cranial Nerves: No cranial nerve deficit.      Sensory: No sensory deficit.      Motor: No weakness.   Psychiatric:         Mood and Affect: Mood normal.         Behavior: Behavior normal.         Thought Content: Thought content normal.         Judgment: Judgment normal.         Assessment & Plan  Diabetes mellitus without complication (Multi)  Will order labs to be done when able to fast   Advised to stop the Glimepiride as he is only doing 1/4 tablet   A1C goal is now <8%   Orders:    glimepiride (AmaryL) 1 mg tablet; Take 0.5 tablets (0.5 mg) by mouth once daily in the morning. Take before meals.    Lipid Panel; Future    Hemoglobin A1C; Future    Follow Up In Primary Care - Established;  Future    Benign prostatic hyperplasia without lower urinary tract symptoms         Stage 3b chronic kidney disease (Multi)  Sees nephrology  Just had BMP with stable CKD   Orders:    Hepatic function panel; Future    Follow Up In Primary Care - Established; Future    Renal cell carcinoma, unspecified laterality (Multi)  Sees urology  Only doing surveillance     Orders:    Hepatic function panel; Future    Follow Up In Primary Care - Established; Future    Medicare annual wellness visit, subsequent         Spinal stenosis, lumbar region, with neurogenic claudication  Working with PT now  Takes Tylenol ES 1000 mg TID   Orders:    Follow Up In Primary Care - Established; Future    Need for influenza vaccination    Orders:    Flu vaccine, trivalent, preservative free, HIGH-DOSE, age 65y+ (Fluzone)

## 2024-09-13 NOTE — ASSESSMENT & PLAN NOTE
Sees nephrology  Just had BMP with stable CKD   Orders:    Hepatic function panel; Future    Follow Up In Primary Care - Established; Future

## 2024-09-13 NOTE — ASSESSMENT & PLAN NOTE
Working with PT now  Takes Tylenol ES 1000 mg TID   Orders:    Follow Up In Primary Care - Established; Future

## 2024-09-13 NOTE — PATIENT INSTRUCTIONS
Get your labs done in next 1 week when able to fast or have very light breakfast (piece of dry toast)-orders are in-no appointment needed  You got your high dose flu shot today; please get Covid booster if you are interested in next few weeks  If BS is staying <180 and if always <150 then stop Glimepiride use   Continue to see specialist as directed   Consider DNR-CCA which means no resuscitation would be done if your heart was to stop beating/not breathing on your own-see from   Follow up here in 3 months

## 2024-09-16 RX ORDER — CALCITRIOL 0.25 UG/1
0.25 CAPSULE ORAL EVERY OTHER DAY
COMMUNITY

## 2024-09-17 ENCOUNTER — LAB (OUTPATIENT)
Dept: LAB | Facility: LAB | Age: 89
End: 2024-09-17
Payer: MEDICARE

## 2024-09-17 ENCOUNTER — TELEPHONE (OUTPATIENT)
Dept: PRIMARY CARE | Facility: CLINIC | Age: 89
End: 2024-09-17

## 2024-09-17 DIAGNOSIS — C64.9 RENAL CELL CARCINOMA, UNSPECIFIED LATERALITY (MULTI): ICD-10-CM

## 2024-09-17 DIAGNOSIS — N18.32 STAGE 3B CHRONIC KIDNEY DISEASE (MULTI): ICD-10-CM

## 2024-09-17 DIAGNOSIS — E11.9 DIABETES MELLITUS WITHOUT COMPLICATION (MULTI): ICD-10-CM

## 2024-09-17 LAB
ALBUMIN SERPL BCP-MCNC: 3.9 G/DL (ref 3.4–5)
ALP SERPL-CCNC: 32 U/L (ref 33–136)
ALT SERPL W P-5'-P-CCNC: 9 U/L (ref 10–52)
AST SERPL W P-5'-P-CCNC: 14 U/L (ref 9–39)
BILIRUB DIRECT SERPL-MCNC: 0.1 MG/DL (ref 0–0.3)
BILIRUB SERPL-MCNC: 0.5 MG/DL (ref 0–1.2)
CHOLEST SERPL-MCNC: 123 MG/DL (ref 0–199)
CHOLESTEROL/HDL RATIO: 2.8
EST. AVERAGE GLUCOSE BLD GHB EST-MCNC: 120 MG/DL
HBA1C MFR BLD: 5.8 %
HDLC SERPL-MCNC: 43.8 MG/DL
LDLC SERPL CALC-MCNC: 64 MG/DL
NON HDL CHOLESTEROL: 79 MG/DL (ref 0–149)
PROT SERPL-MCNC: 6.3 G/DL (ref 6.4–8.2)
TRIGL SERPL-MCNC: 77 MG/DL (ref 0–149)
VLDL: 15 MG/DL (ref 0–40)

## 2024-09-17 PROCEDURE — 36415 COLL VENOUS BLD VENIPUNCTURE: CPT

## 2024-09-17 PROCEDURE — 83036 HEMOGLOBIN GLYCOSYLATED A1C: CPT

## 2024-09-17 PROCEDURE — 80076 HEPATIC FUNCTION PANEL: CPT

## 2024-09-17 PROCEDURE — 80061 LIPID PANEL: CPT

## 2024-09-17 NOTE — TELEPHONE ENCOUNTER
----- Message from Krystyna Shultz sent at 9/17/2024  1:22 PM EDT -----  A1C is very good-5.8% so he can stop glimepiride use all together-does not need this any more

## 2024-09-20 ENCOUNTER — APPOINTMENT (OUTPATIENT)
Dept: CARDIOLOGY | Facility: CLINIC | Age: 89
End: 2024-09-20
Payer: MEDICARE

## 2024-09-20 VITALS
HEIGHT: 64 IN | BODY MASS INDEX: 30.56 KG/M2 | OXYGEN SATURATION: 97 % | SYSTOLIC BLOOD PRESSURE: 140 MMHG | DIASTOLIC BLOOD PRESSURE: 70 MMHG | WEIGHT: 179 LBS | HEART RATE: 72 BPM

## 2024-09-20 DIAGNOSIS — M54.50 CHRONIC LOW BACK PAIN, UNSPECIFIED BACK PAIN LATERALITY, UNSPECIFIED WHETHER SCIATICA PRESENT: ICD-10-CM

## 2024-09-20 DIAGNOSIS — I10 PRIMARY HYPERTENSION: ICD-10-CM

## 2024-09-20 DIAGNOSIS — R00.1 BRADYCARDIA: ICD-10-CM

## 2024-09-20 DIAGNOSIS — I45.10 COMPLETE RIGHT BUNDLE BRANCH BLOCK: Primary | ICD-10-CM

## 2024-09-20 DIAGNOSIS — N18.9 CHRONIC KIDNEY DISEASE, UNSPECIFIED CKD STAGE: ICD-10-CM

## 2024-09-20 DIAGNOSIS — E78.00 PURE HYPERCHOLESTEROLEMIA: ICD-10-CM

## 2024-09-20 DIAGNOSIS — G89.29 CHRONIC LOW BACK PAIN, UNSPECIFIED BACK PAIN LATERALITY, UNSPECIFIED WHETHER SCIATICA PRESENT: ICD-10-CM

## 2024-09-20 PROCEDURE — 99214 OFFICE O/P EST MOD 30 MIN: CPT | Performed by: INTERNAL MEDICINE

## 2024-09-20 PROCEDURE — 3077F SYST BP >= 140 MM HG: CPT | Performed by: INTERNAL MEDICINE

## 2024-09-20 PROCEDURE — 3078F DIAST BP <80 MM HG: CPT | Performed by: INTERNAL MEDICINE

## 2024-09-20 PROCEDURE — 1123F ACP DISCUSS/DSCN MKR DOCD: CPT | Performed by: INTERNAL MEDICINE

## 2024-09-20 PROCEDURE — 1159F MED LIST DOCD IN RCRD: CPT | Performed by: INTERNAL MEDICINE

## 2024-09-20 PROCEDURE — 1036F TOBACCO NON-USER: CPT | Performed by: INTERNAL MEDICINE

## 2024-09-20 PROCEDURE — 1160F RVW MEDS BY RX/DR IN RCRD: CPT | Performed by: INTERNAL MEDICINE

## 2024-09-20 PROCEDURE — 1157F ADVNC CARE PLAN IN RCRD: CPT | Performed by: INTERNAL MEDICINE

## 2024-09-20 PROCEDURE — 93000 ELECTROCARDIOGRAM COMPLETE: CPT | Performed by: INTERNAL MEDICINE

## 2024-09-20 NOTE — PROGRESS NOTES
"  Subjective  Nik Washington  is a 89 y.o. year old male who presents for HTN, RBBB.  Notes severe back pain.  No chest pan, no dyspnea    Blood pressure 140/70, pulse 72, height 1.626 m (5' 4\"), weight 81.2 kg (179 lb), SpO2 97%.   Patient has no known allergies.  Past Medical History:   Diagnosis Date    Class 1 obesity without serious comorbidity with body mass index (BMI) of 30.0 to 30.9 in adult 03/08/2024    Elevated prostate specific antigen (PSA) 02/23/2021    High prostate specific antigen (PSA)    Encounter for general adult medical examination without abnormal findings 09/29/2021    Medicare annual wellness visit, subsequent    Other conditions influencing health status 05/26/2021    Diabetes mellitus type 2, uncontrolled    Other specified symptoms and signs involving the circulatory and respiratory systems     Abdominal bruit    Personal history of other diseases of the nervous system and sense organs 12/01/2017    History of carpal tunnel syndrome    Personal history of other drug therapy 09/29/2021    History of influenza vaccination    Personal history of other endocrine, nutritional and metabolic disease     History of hyperkalemia    Personal history of other malignant neoplasm of skin     History of malignant neoplasm of skin     Past Surgical History:   Procedure Laterality Date    CARPAL TUNNEL RELEASE Bilateral 01/26/2017    Neuroplasty Decompression Median Nerve At Carpal Tunnel    CATARACT EXTRACTION  03/07/2017    Cataract Surgery    COLONOSCOPY  02/27/2019    Complete Colonoscopy     Family History   Problem Relation Name Age of Onset    Arthritis Mother      Other (CARDIAC DISORDER) Mother      Diabetes Mother      Hypertension Mother      Diabetes Father      Diabetes Sister      Diabetes Brother      Diabetes Sibling       @SOC    Current Outpatient Medications   Medication Sig Dispense Refill    acetaminophen (Tylenol) 500 mg capsule Take 2 capsules (1,000 mg) by mouth every 6 hours " if needed.      amLODIPine (Norvasc) 5 mg tablet Take 1 tablet (5 mg) by mouth once daily.      aspirin 81 mg EC tablet Take 1 tablet (81 mg) by mouth once daily.      blood sugar diagnostic (Accu-Chek Guide test strips) strip 1 strip once daily. 100 strip 3    calcitriol (Rocaltrol) 0.25 mcg capsule Take 1 capsule (0.25 mcg) by mouth every other day.      cholecalciferol (Vitamin D-3) 25 MCG (1000 UT) capsule Take by mouth.      diclofenac sodium (Voltaren) 1 % gel gel PER DIRECTED TRANSDERMAL      lancets (Accu-Chek Fastclix Lancet Drum) misc 1 each once daily. 100 each 3    simvastatin (Zocor) 40 mg tablet TAKE ONE TABLET BY MOUTH EVERY DAY 90 tablet 1    terazosin (Hytrin) 5 mg capsule TAKE ONE CAPSULE BY MOUTH EVERY DAY 90 capsule 1     No current facility-administered medications for this visit.        ROS  Review of Systems   All other systems reviewed and are negative.      Physical Exam  Physical Exam  Constitutional:       Appearance: He is obese.   HENT:      Head: Normocephalic.   Cardiovascular:      Rate and Rhythm: Normal rate and regular rhythm.      Heart sounds: Murmur heard.      Crescendo decrescendo systolic murmur is present with a grade of 2/6.      Comments: S2 sidely split  Pulmonary:      Effort: Pulmonary effort is normal.      Breath sounds: Normal breath sounds.   Abdominal:      Palpations: Abdomen is soft.   Skin:     General: Skin is warm and dry.   Neurological:      General: No focal deficit present.      Mental Status: He is oriented to person, place, and time.   Psychiatric:         Mood and Affect: Mood normal.          EKG  Encounter Date: 09/20/24   ECG 12 Lead    Narrative    NSR at 65/min., RBBB       Problem List Items Addressed This Visit       Chronic kidney disease    Complete right bundle branch block - Primary    Hyperlipidemia    Hypertension    Back pain     Other Visit Diagnoses       Bradycardia        Relevant Orders    ECG 12 Lead (Completed)              Same  meds  with recheck EKG 6 months return      Carl Coello MD

## 2024-09-23 ENCOUNTER — TREATMENT (OUTPATIENT)
Dept: PHYSICAL THERAPY | Facility: CLINIC | Age: 89
End: 2024-09-23
Payer: MEDICARE

## 2024-09-23 DIAGNOSIS — R27.0 ATAXIA: ICD-10-CM

## 2024-09-23 DIAGNOSIS — M48.062 SPINAL STENOSIS, LUMBAR REGION, WITH NEUROGENIC CLAUDICATION: Primary | ICD-10-CM

## 2024-09-23 PROCEDURE — 97110 THERAPEUTIC EXERCISES: CPT | Mod: GP

## 2024-09-23 NOTE — PROGRESS NOTES
Physical Therapy Treatment    Patient Name: Nik Washington  MRN: [id number]  Today's Date: 9/23/2024   Visit 3/MN  Time Calculation  Start Time: 0700  Stop Time: 0745  Time Calculation (min): 45 min  Dx: imbalance (lumbar stenosis)    PRECAUTIONS: imbalance    SUBJECTIVE:  Patient reports irritated L glute/LE area recently.  Feels like may have been from his gym work out but not sure which activity exactly  Pain initially prevented him from reaching down for shoes.  Seems to have more pain with bending, sitting long periods.  Goes down leg back of L thigh.  Feels better with walking.  No pain currently  Compliant with HEP? Yes     OBJECTIVE:  Prone= mild sx present  Prone with passive L knee flexion= pain at beginning of motion, but no pain at end range  + pain with resisted hamstring curl L     TREATMENT:  - Therex:  Scifit (LE only) x 5 min (L8)  Calf stretch on slantboard x 1 min  8 inch step ups x 10 reps bilat  Standing hamstring stretch x 1 min bilat  L lunge/flexion stretch to stretch proximal hamstring x 1 min  Heel and toes raises on 1/2 foam x 20 reps bilat  Wall lift offs    Current HEP:  Pelvic tilts, seated hamstring stretch, standing HF stretch, 1/2 tandem balance, and SLS with UE support.  Added wall lift offs and heel/toe raises    - Neuromuscular Re-education:   Balance Training:  -tandem partial to full)  -1/2 foam- static AP  -SLS   -alternating LE taps    ASSESSMENT:   Patient with new L posterior hip sx (at gluteal crease) which may be related to proximal hamstring strain.    EDUCATION:  Continued HEP, avoidance of painful machines at gym, gentle hamstring stretch (seated and KTC).  PLAN:   Continue to address LE strength and balance   Assess new hamstring pain again next visit.    Billing:

## 2024-10-03 ENCOUNTER — TREATMENT (OUTPATIENT)
Dept: PHYSICAL THERAPY | Facility: CLINIC | Age: 89
End: 2024-10-03
Payer: MEDICARE

## 2024-10-03 DIAGNOSIS — R27.0 ATAXIA: ICD-10-CM

## 2024-10-03 DIAGNOSIS — M48.062 SPINAL STENOSIS, LUMBAR REGION, WITH NEUROGENIC CLAUDICATION: ICD-10-CM

## 2024-10-03 PROCEDURE — 97110 THERAPEUTIC EXERCISES: CPT | Mod: GP

## 2024-10-03 NOTE — PROGRESS NOTES
Physical Therapy Treatment    Patient Name: Nik Washington  MRN: [id number]  Today's Date: 10/3/2024   Visit 4/MN  Time Calculation  Start Time: 0900  Stop Time: 0940  Time Calculation (min): 40 min  Dx: imbalance (lumbar stenosis)    PRECAUTIONS: imbalance    SUBJECTIVE:  Patient reports L posterior thigh pain still present but does feel it's getting better slowly.  Hurts if sits on a hard chair.  Feels better with walking.  0/10 currently  Compliant with HEP? Yes     OBJECTIVE:  Tandem= WNL     TREATMENT:  - Therex:  Scifit (LE only) x 5 min (L8)  Calf stretch on slantboard x 1 min  8 inch step ups - fwd and sideways x 10 reps bilat  Standing hamstring stretch x 1 min bilat  L lunge/flexion stretch to stretch proximal hamstring x 1 min  Heel and toes raises on 1/2 foam x 20 reps bilat  Wall lift offs    Current HEP:  Pelvic tilts, seated hamstring stretch, standing HF stretch, 1/2 tandem balance, and SLS with UE support.  Added wall lift offs and heel/toe raises  -Manual:  [place] to L hamstring    - Neuromuscular Re-education:   Balance Training:  -rockerboard - AP, LR- static and dynamic  -tandem   -1/2 foam- static AP  -SLS   -alternating LE taps    ASSESSMENT:   Patient very motivated and compliant with HEP   Leg sx are present most in sitting with hard surface, but does have increased sx with resisted knee flexion    EDUCATION:  Continued HEP  PLAN:   Continue to address LE strength and balance     Billing:     PT Therapeutic Procedures Time Entry  Therapeutic Exercise Time Entry: 40

## 2024-10-09 ENCOUNTER — TREATMENT (OUTPATIENT)
Dept: PHYSICAL THERAPY | Facility: CLINIC | Age: 89
End: 2024-10-09
Payer: MEDICARE

## 2024-10-09 DIAGNOSIS — M48.062 SPINAL STENOSIS, LUMBAR REGION, WITH NEUROGENIC CLAUDICATION: Primary | ICD-10-CM

## 2024-10-09 DIAGNOSIS — R27.0 ATAXIA: ICD-10-CM

## 2024-10-09 PROCEDURE — 97112 NEUROMUSCULAR REEDUCATION: CPT | Mod: GP

## 2024-10-09 PROCEDURE — 97110 THERAPEUTIC EXERCISES: CPT | Mod: GP

## 2024-10-09 NOTE — PROGRESS NOTES
Physical Therapy Treatment    Patient Name: Nik Washington  MRN: [id number]  Today's Date: 10/9/2024   Visit 5/MN  Time Calculation  Start Time: 0915  Stop Time: 0955  Time Calculation (min): 40 min  Dx: imbalance (lumbar stenosis)    PRECAUTIONS: imbalance    SUBJECTIVE:  Patient reports L leg pain is significantly improved, almost fully resolved  0/10   Compliant with HEP? Yes     OBJECTIVE:  SLS L= 7 sec    TREATMENT:  - Therex:  Scifit (LE only) x 5 min (L8)  Calf stretch on slantboard x 1 min  8 inch step ups - fwd and sideways x 10 reps bilat  Standing hamstring stretch x 1 min bilat  Heel and toes raises on 1/2 foam x 20 reps bilat  Wall lift offs    Current HEP:  Pelvic tilts, seated hamstring stretch, standing HF stretch, 1/2 tandem balance, and SLS with UE support.  Added wall lift offs and heel/toe raises    - Neuromuscular Re-education:   Balance Training:  -rockerboard - AP, LR- static and dynamic  -tandem   -1/2 foam- static AP  -SLS   -foam- feet apart EO, eC  -foam- romberg EO, EC  -alternating LE taps    ASSESSMENT:   Patient very motivated and compliant with HEP  Balance is improving, ie: SLS 7 sec    EDUCATION:  Continued HEP  PLAN:   Continue to address LE strength and balance     Billing:     PT Therapeutic Procedures Time Entry  Neuromuscular Re-Education Time Entry: 30  Therapeutic Exercise Time Entry: 10

## 2024-10-15 ENCOUNTER — TREATMENT (OUTPATIENT)
Dept: PHYSICAL THERAPY | Facility: CLINIC | Age: 89
End: 2024-10-15
Payer: MEDICARE

## 2024-10-15 DIAGNOSIS — M48.062 SPINAL STENOSIS, LUMBAR REGION, WITH NEUROGENIC CLAUDICATION: Primary | ICD-10-CM

## 2024-10-15 DIAGNOSIS — R27.0 ATAXIA: ICD-10-CM

## 2024-10-15 PROCEDURE — 97110 THERAPEUTIC EXERCISES: CPT | Mod: GP

## 2024-10-15 PROCEDURE — 97112 NEUROMUSCULAR REEDUCATION: CPT | Mod: GP

## 2024-10-15 NOTE — PROGRESS NOTES
Physical Therapy Treatment    Patient Name: Nik Washington  MRN: [id number]  Today's Date: 10/15/2024   Visit 6/MN  Time Calculation  Start Time: 1030  Stop Time: 1115  Time Calculation (min): 45 min  Dx: imbalance (lumbar stenosis)    PRECAUTIONS: imbalance    SUBJECTIVE:  Patient reports L leg pain continues to improve  0/10   Compliant with HEP? Yes     OBJECTIVE:  Slightly more difficulty lifting L ankle DF than R in standing    TREATMENT:  - Therex:  Scifit (LE only) x 5 min (L8)  Calf stretch on slantboard x 1 min  8 inch step ups - fwd and sideways x 10 reps bilat  Standing hamstring stretch x 1 min bilat  Heel and toes raises on 1/2 foam x 20 reps bilat  RTB resisted ankle DF x 20 reps bilat  Sit to stands x 10 reps with instruction in fwd wt shift and scooting to end of seat    Current HEP:  Pelvic tilts, seated hamstring stretch, standing HF stretch, 1/2 tandem balance, and SLS with UE support.  Added wall lift offs and heel/toe raises    - Neuromuscular Re-education:   Balance Training:  -rockerboard - AP, LR- static and dynamic  -tandem   -1/2 foam- static AP  -SLS   -foam- feet with vertical and horiz head turns  -foam- romberg EO, EC    ASSESSMENT:  Patient tolerating sessions very well.  Challenged with SLS conditions.  Improve balance with sit to stand with instruction.    EDUCATION:  Continued HEP  PLAN:   Continue to address LE strength and balance   Advance HEP next visit    Billing:     PT Therapeutic Procedures Time Entry  Neuromuscular Re-Education Time Entry: 35  Therapeutic Exercise Time Entry: 20

## 2024-10-21 ENCOUNTER — TREATMENT (OUTPATIENT)
Dept: PHYSICAL THERAPY | Facility: CLINIC | Age: 89
End: 2024-10-21
Payer: MEDICARE

## 2024-10-21 DIAGNOSIS — R27.0 ATAXIA: ICD-10-CM

## 2024-10-21 DIAGNOSIS — M48.062 SPINAL STENOSIS, LUMBAR REGION, WITH NEUROGENIC CLAUDICATION: Primary | ICD-10-CM

## 2024-10-21 PROCEDURE — 97110 THERAPEUTIC EXERCISES: CPT | Mod: GP

## 2024-10-21 PROCEDURE — 97112 NEUROMUSCULAR REEDUCATION: CPT | Mod: GP

## 2024-10-21 NOTE — PROGRESS NOTES
Physical Therapy Treatment    Patient Name: Nik Washington  MRN: [id number]  Today's Date: 10/21/2024   Visit 7/MN  Time Calculation  Start Time: 1000  Stop Time: 1040  Time Calculation (min): 40 min  Dx: imbalance (lumbar stenosis)    PRECAUTIONS: imbalance    SUBJECTIVE:  No new c/o today.  Noticing improved balance, ie: if turns/changes directions quickly  0/10   Compliant with HEP? Yes     OBJECTIVE:  Kyphotic posture  Tandem= 30 sec    TREATMENT:  - Therex:  Scifit (LE only) x 5 min (L8)  Calf stretch on slantboard x 1 min  Standing HF stretch x 1 min bilat  8 inch step ups - fwd and sideways x 10 reps bilat  Standing hamstring stretch x 1 min bilat  Heel and toes raises on 1/2 foam x 20 reps bilat  Sit to stands 2 x 10 reps     - Neuromuscular Re-education:   Balance Training:  -rockerboard - AP, LR- static and dynamic  -tandem   -1/2 foam- static AP  -SLS   -foam- feet apart with vertical and horiz head turns  -foam- romberg EO, EC    Current HEP:  Pelvic tilts, seated hamstring stretch, standing HF stretch, 1/2 tandem balance, and SLS with UE support, step ups, wall lift offs and heel/toe raises.  Added sit to stands  ASSESSMENT:  Patient is doing very well overall.  Very motivated     EDUCATION:  Continued HEP  PLAN:   Will see 1 more visit, then will likely do a follow up 1 month later.    Billing:     PT Therapeutic Procedures Time Entry  Neuromuscular Re-Education Time Entry: 25  Therapeutic Exercise Time Entry: 15

## 2024-10-28 ENCOUNTER — TREATMENT (OUTPATIENT)
Dept: PHYSICAL THERAPY | Facility: CLINIC | Age: 89
End: 2024-10-28
Payer: MEDICARE

## 2024-10-28 DIAGNOSIS — M48.062 SPINAL STENOSIS, LUMBAR REGION, WITH NEUROGENIC CLAUDICATION: Primary | ICD-10-CM

## 2024-10-28 DIAGNOSIS — R27.0 ATAXIA: ICD-10-CM

## 2024-10-28 PROCEDURE — 97112 NEUROMUSCULAR REEDUCATION: CPT | Mod: GP

## 2024-10-28 PROCEDURE — 97110 THERAPEUTIC EXERCISES: CPT | Mod: GP

## 2024-11-06 ENCOUNTER — APPOINTMENT (OUTPATIENT)
Dept: PODIATRY | Facility: CLINIC | Age: 89
End: 2024-11-06
Payer: MEDICARE

## 2024-11-06 DIAGNOSIS — M79.674 TOE PAIN, RIGHT: Primary | ICD-10-CM

## 2024-11-06 DIAGNOSIS — E11.9 DIABETES MELLITUS WITHOUT COMPLICATION (MULTI): ICD-10-CM

## 2024-11-06 DIAGNOSIS — M79.675 TOE PAIN, LEFT: ICD-10-CM

## 2024-11-06 DIAGNOSIS — B35.1 TINEA UNGUIUM: ICD-10-CM

## 2024-11-06 PROCEDURE — 11721 DEBRIDE NAIL 6 OR MORE: CPT | Performed by: PODIATRIST

## 2024-11-06 PROCEDURE — 1036F TOBACCO NON-USER: CPT | Performed by: PODIATRIST

## 2024-11-06 PROCEDURE — 1157F ADVNC CARE PLAN IN RCRD: CPT | Performed by: PODIATRIST

## 2024-11-06 PROCEDURE — 1159F MED LIST DOCD IN RCRD: CPT | Performed by: PODIATRIST

## 2024-11-06 PROCEDURE — 1123F ACP DISCUSS/DSCN MKR DOCD: CPT | Performed by: PODIATRIST

## 2024-11-06 NOTE — PROGRESS NOTES
CC:  painful thickened and elongated toenails and diabetic care.      HPI: Pt presents for dm foot exam and painful thickened and elongated toenails that are difficult to manage.  Onset was gradual with worsening course until recently.  Aggravated by shoe gear and ambulation. Had some issues with swelling feet. Wears compression hose.        PCP: Dr. Shultz  Last visit: 9-13-24     PMH  Medical History           Past Medical History:   Diagnosis Date    Elevated prostate specific antigen (PSA) 02/23/2021     High prostate specific antigen (PSA)    Encounter for general adult medical examination without abnormal findings 09/29/2021     Medicare annual wellness visit, subsequent    Other conditions influencing health status 05/26/2021     Diabetes mellitus type 2, uncontrolled    Other specified symptoms and signs involving the circulatory and respiratory systems       Abdominal bruit    Personal history of other diseases of the nervous system and sense organs 12/01/2017     History of carpal tunnel syndrome    Personal history of other drug therapy 09/29/2021     History of influenza vaccination    Personal history of other endocrine, nutritional and metabolic disease       History of hyperkalemia    Personal history of other malignant neoplasm of skin       History of malignant neoplasm of skin         MEDS     Current Outpatient Medications:     acetaminophen (Tylenol) 500 mg capsule, Take 2 capsules (1,000 mg) by mouth every 6 hours if needed., Disp: , Rfl:     amLODIPine (Norvasc) 5 mg tablet, Take 1 tablet (5 mg) by mouth once daily., Disp: , Rfl:     ammonium lactate (Amlactin) 12 % cream, Apply topically if needed for dry skin. APPLY PER DIRECTED, Disp: , Rfl:     aspirin 81 mg EC tablet, Take 1 tablet (81 mg) by mouth once daily., Disp: , Rfl:     cholecalciferol (Vitamin D-3) 25 MCG (1000 UT) capsule, Take by mouth., Disp: , Rfl:     diclofenac sodium (Voltaren) 1 % gel gel, PER DIRECTED TRANSDERMAL, Disp: ,  Rfl:     gabapentin (Neurontin) 300 mg capsule, Take 1 capsule (300 mg) by mouth once daily., Disp: 90 capsule, Rfl: 1    simvastatin (Zocor) 40 mg tablet, TAKE ONE TABLET BY MOUTH EVERY DAY, Disp: 90 tablet, Rfl: 1    terazosin (Hytrin) 5 mg capsule, TAKE ONE CAPSULE BY MOUTH EVERY DAY, Disp: 90 capsule, Rfl: 1  Allergies  No Known Allergies  Social History   Social History                Socioeconomic History    Marital status:        Spouse name: Not on file    Number of children: 2    Years of education: Not on file    Highest education level: Not on file   Occupational History    Occupation: retired   Tobacco Use    Smoking status: Former       Packs/day: 1.00       Years: 30.00       Additional pack years: 0.00       Total pack years: 30.00       Types: Cigarettes       Quit date:        Years since quittin.1    Smokeless tobacco: Never   Vaping Use    Vaping Use: Never used   Substance and Sexual Activity    Alcohol use: Yes       Comment: special occasions    Drug use: Never    Sexual activity: Not Currently   Other Topics Concern    Not on file   Social History Narrative    Not on file      Social Determinants of Health      Financial Resource Strain: Not on file   Food Insecurity: Not on file   Transportation Needs: Not on file   Physical Activity: Not on file   Stress: Not on file   Social Connections: Not on file   Intimate Partner Violence: Not on file   Housing Stability: Not on file         Family History               Family History   Problem Relation Name Age of Onset    Arthritis Mother        Other (CARDIAC DISORDER) Mother        Diabetes Mother        Hypertension Mother        Diabetes Father        Diabetes Sister        Diabetes Brother        Diabetes Sibling             Surgical History             Past Surgical History:   Procedure Laterality Date    CARPAL TUNNEL RELEASE Bilateral 2017     Neuroplasty Decompression Median Nerve At Carpal Tunnel    CATARACT EXTRACTION    03/07/2017     Cataract Surgery    COLONOSCOPY   02/27/2019     Complete Colonoscopy            REVIEW OF SYSTEMS  DERM:   + as noted in HPI.         Physical examination:   On General Observation: Patient is a pleasant, cooperative, well developed 89 y.o. diabetic   adult male. The patient is alert and oriented to time, place and person. Patient has normal affect and mood.  /80      Vascular:  DP and PT pulses are 2/4 b/l.  mild edema noted. mild varicosities b/l.  CFT  5 seconds to all digits bilateral.  Skin temperature is warm to warm from proximal to distal bilateral.       Muscular: Strength is 5/5 for all instrinsic and extrinsic muscle groups.      Neuro:  Proprioception present.   Sensation to vibration is  intact. Protective sensation present  at all pedal sites via [place] Doc 5.07 monofilament bilateral.  Light touch intact bilateral.      Derm:    Left toenails: 1-5 Brittleness, crumbling upon debridement, subungual debris, elongation, mycotic appearance, tenderness, and thickness.   Right toenails: 1-5 Brittleness, crumbling upon debridement, subungual debris, elongation, mycotic appearance, tenderness, and thickness.   Hair growth is decreased b/l le     ASSESSMENT:      Tinea Unguium [B35.1]   E11.9  Pain in right toe(s) [M79.674]   Pain in left toe(s) [M79.675]         PLAN:     DM foot care and DM foot manifestations were reviewed.  The patient was educated on clinical findings, diagnosis and treatment plans. Patient understands all that has been explained and all questions were answered to apparent satisfaction.      - Debrided toenails 1-10 in length and height.   - Follow up in 9-12 weeks.

## 2024-11-29 ENCOUNTER — TREATMENT (OUTPATIENT)
Dept: PHYSICAL THERAPY | Facility: CLINIC | Age: 89
End: 2024-11-29
Payer: MEDICARE

## 2024-11-29 DIAGNOSIS — M48.062 SPINAL STENOSIS, LUMBAR REGION, WITH NEUROGENIC CLAUDICATION: Primary | ICD-10-CM

## 2024-11-29 DIAGNOSIS — R27.0 ATAXIA: ICD-10-CM

## 2024-11-29 PROCEDURE — 97110 THERAPEUTIC EXERCISES: CPT | Mod: GP

## 2024-11-29 NOTE — PROGRESS NOTES
Physical Therapy Treatment    Patient Name: Nik Washington  MRN: [id number]  Today's Date: 11/29/2024   Visit 9/MN  Time Calculation  Start Time: 0830  Stop Time: 0900  Time Calculation (min): 30 min  Dx: imbalance (lumbar stenosis)    PRECAUTIONS: imbalance    SUBJECTIVE:  Patient reports doesn't feel much improvement in balance.  Feels going upstairs is improving, but down stairs is still challenging.  Is very cautious.  0/10 pain  Compliant with HEP? Yes     OBJECTIVE:  OSBORNE= 52/56    TREATMENT:  - Therex:  Retested for DC summary.  See objective for details.  Reviewed and updated HEP.    Current HEP:  Seated hamstring stretch, standing HF stretch, tandem balance, and SLS with UE support, step ups, wall lift offs and heel/toe raises, sit to stands.  Added step downs  ASSESSMENT:  Patient shows improvement in balance with OSBORNE testing.  Is very compliant with HEP and encouraged him to continue with mild modifications.    EDUCATION:  Continued HEP  PLAN:   DC to indep HEP.    Billing:     PT Therapeutic Procedures Time Entry  Therapeutic Exercise Time Entry: 30

## 2024-12-19 ENCOUNTER — APPOINTMENT (OUTPATIENT)
Dept: PRIMARY CARE | Facility: CLINIC | Age: 89
End: 2024-12-19
Payer: MEDICARE

## 2024-12-19 VITALS
DIASTOLIC BLOOD PRESSURE: 58 MMHG | HEART RATE: 64 BPM | WEIGHT: 182.9 LBS | RESPIRATION RATE: 14 BRPM | OXYGEN SATURATION: 98 % | SYSTOLIC BLOOD PRESSURE: 158 MMHG | BODY MASS INDEX: 31.39 KG/M2

## 2024-12-19 DIAGNOSIS — E11.9 DIABETES MELLITUS WITHOUT COMPLICATION (MULTI): Primary | ICD-10-CM

## 2024-12-19 DIAGNOSIS — Z85.828 HISTORY OF MALIGNANT NEOPLASM OF SKIN: ICD-10-CM

## 2024-12-19 DIAGNOSIS — C64.9 RENAL CELL CARCINOMA, UNSPECIFIED LATERALITY (MULTI): ICD-10-CM

## 2024-12-19 DIAGNOSIS — M48.062 SPINAL STENOSIS, LUMBAR REGION, WITH NEUROGENIC CLAUDICATION: ICD-10-CM

## 2024-12-19 DIAGNOSIS — N18.32 STAGE 3B CHRONIC KIDNEY DISEASE (MULTI): ICD-10-CM

## 2024-12-19 LAB — POC HEMOGLOBIN A1C: 7.6 % (ref 4.2–6.5)

## 2024-12-19 PROCEDURE — 3077F SYST BP >= 140 MM HG: CPT | Performed by: INTERNAL MEDICINE

## 2024-12-19 PROCEDURE — 99214 OFFICE O/P EST MOD 30 MIN: CPT | Performed by: INTERNAL MEDICINE

## 2024-12-19 PROCEDURE — 3078F DIAST BP <80 MM HG: CPT | Performed by: INTERNAL MEDICINE

## 2024-12-19 PROCEDURE — 1123F ACP DISCUSS/DSCN MKR DOCD: CPT | Performed by: INTERNAL MEDICINE

## 2024-12-19 PROCEDURE — 1159F MED LIST DOCD IN RCRD: CPT | Performed by: INTERNAL MEDICINE

## 2024-12-19 PROCEDURE — 1157F ADVNC CARE PLAN IN RCRD: CPT | Performed by: INTERNAL MEDICINE

## 2024-12-19 PROCEDURE — 83036 HEMOGLOBIN GLYCOSYLATED A1C: CPT | Performed by: INTERNAL MEDICINE

## 2024-12-19 PROCEDURE — 1160F RVW MEDS BY RX/DR IN RCRD: CPT | Performed by: INTERNAL MEDICINE

## 2024-12-19 ASSESSMENT — ENCOUNTER SYMPTOMS
DIFFICULTY URINATING: 0
FATIGUE: 0
ENDOCRINE COMMENTS: +DM
APPETITE CHANGE: 0
CONSTIPATION: 0
HEADACHES: 0
SHORTNESS OF BREATH: 0
ACTIVITY CHANGE: 0
COUGH: 0
NAUSEA: 0
UNEXPECTED WEIGHT CHANGE: 0
DIARRHEA: 0
BLOOD IN STOOL: 0
ROS GI COMMENTS: +GASSY
DIZZINESS: 0
LIGHT-HEADEDNESS: 0
FEVER: 0
PALPITATIONS: 0
BACK PAIN: 1
VOMITING: 0
ABDOMINAL PAIN: 0
WHEEZING: 0
CHEST TIGHTNESS: 0
CHILLS: 0

## 2024-12-19 ASSESSMENT — PATIENT HEALTH QUESTIONNAIRE - PHQ9
2. FEELING DOWN, DEPRESSED OR HOPELESS: NOT AT ALL
SUM OF ALL RESPONSES TO PHQ9 QUESTIONS 1 AND 2: 0
1. LITTLE INTEREST OR PLEASURE IN DOING THINGS: NOT AT ALL

## 2024-12-19 NOTE — PATIENT INSTRUCTIONS
Continue all medications as directed-call when refills needed  Your average blood sugars are still very good off medications  Continue healthy diet and exercise/be as active as able  Lets improve diet with less breads/pastas/potatoes and less cookies/candies/sugar as your A1C did go up  See specialist as directed  Continue to check BP at home-goal is <140/90 consistently-if you see this at home we are ok; if you see higher readings let me know   Follow up in 3 months

## 2024-12-19 NOTE — ASSESSMENT & PLAN NOTE
A1c has increased since coming off medication  Now 7.6%  Continue home sugar checks but want post prandial readings as well as fasting   Pt admits to poor diet  Would like him to try to lessen carbs/sugar in diet   Will see him back in 3 months and repeat A1C-if high will restart him on medication

## 2024-12-19 NOTE — PROGRESS NOTES
Subjective   Patient ID: Nik Washington is a 89 y.o. male who presents for Follow-up (3 month ).    HPI  Pt here in 3 month follow up.  He does a lot of volunteering.  He is up in weight.  Appetite is good.  He had his flu shot and RSV vaccine.      He continues to have back issues.  He tells me this impacts his sleep.  He went to a PT session for balance.  He was given exercises to do at home.      He has been doing his home sugar testing.  His last A1c was 5.8%.  He is off medication at this time.  His average for 30 days was 136.      His home BP readings are 135-140/70.  He is on Amlodipine 5 mg.  He follows with nephrology.      He saw Derm a week ago and did have an area frozen/cut out on scalp.      Review of Systems   Constitutional:  Negative for activity change, appetite change, chills, fatigue, fever and unexpected weight change.   Respiratory:  Negative for cough, chest tightness, shortness of breath and wheezing.    Cardiovascular:  Negative for chest pain, palpitations and leg swelling.   Gastrointestinal:  Negative for abdominal pain, blood in stool, constipation, diarrhea, nausea and vomiting.        +gassy    Endocrine:        +DM    Genitourinary:  Negative for difficulty urinating.   Musculoskeletal:  Positive for back pain.   Neurological:  Negative for dizziness, light-headedness and headaches.       Objective   /58   Pulse 64   Resp 14   Wt 83 kg (182 lb 14.4 oz)   SpO2 98%   BMI 31.39 kg/m²      Physical Exam  Constitutional:       Appearance: Normal appearance.   Cardiovascular:      Rate and Rhythm: Normal rate and regular rhythm.      Heart sounds: Normal heart sounds.   Pulmonary:      Effort: Pulmonary effort is normal.      Breath sounds: Normal breath sounds.   Abdominal:      General: Bowel sounds are normal.      Palpations: Abdomen is soft.   Musculoskeletal:      Right lower leg: No edema.   Skin:     General: Skin is warm.      Findings: Lesion present.       Comments: Scalp lesion noted that recently was excised/burned    Neurological:      Mental Status: He is alert and oriented to person, place, and time.   Psychiatric:         Mood and Affect: Mood normal.         Assessment/Plan   Problem List Items Addressed This Visit       Chronic kidney disease     Sees nephrology  Had visit in 9/2024  GFR 32 in 9/2024          Relevant Orders    Follow Up In Primary Care - Established    Spinal stenosis, lumbar region, with neurogenic claudication     Continues to be an issue   He has seen PMR in past   He is on scheduled Tylenol          Diabetes mellitus without complication (Multi) - Primary     A1c has increased since coming off medication  Now 7.6%  Continue home sugar checks but want post prandial readings as well as fasting   Pt admits to poor diet  Would like him to try to lessen carbs/sugar in diet   Will see him back in 3 months and repeat A1C-if high will restart him on medication           Relevant Orders    POCT glycosylated hemoglobin (Hb A1C) manually resulted (Completed)    Follow Up In Primary Care - Established    Renal cell carcinoma (Multi)     Follows with urology  Just watching at this time  No urinary complaints          History of malignant neoplasm of skin     Saw derm recently and had lesion from scalp excised/burned

## 2025-01-08 DIAGNOSIS — N40.1 BENIGN PROSTATIC HYPERPLASIA WITH LOWER URINARY TRACT SYMPTOMS, SYMPTOM DETAILS UNSPECIFIED: ICD-10-CM

## 2025-01-08 RX ORDER — TERAZOSIN 5 MG/1
5 CAPSULE ORAL DAILY
Qty: 90 CAPSULE | Refills: 1 | Status: SHIPPED | OUTPATIENT
Start: 2025-01-08

## 2025-01-16 DIAGNOSIS — E78.5 HYPERLIPIDEMIA, UNSPECIFIED HYPERLIPIDEMIA TYPE: ICD-10-CM

## 2025-01-16 RX ORDER — SIMVASTATIN 40 MG/1
40 TABLET, FILM COATED ORAL DAILY
Qty: 90 TABLET | Refills: 1 | Status: SHIPPED | OUTPATIENT
Start: 2025-01-16

## 2025-02-06 ENCOUNTER — APPOINTMENT (OUTPATIENT)
Dept: PODIATRY | Facility: CLINIC | Age: OVER 89
End: 2025-02-06
Payer: MEDICARE

## 2025-02-06 DIAGNOSIS — M79.675 TOE PAIN, LEFT: ICD-10-CM

## 2025-02-06 DIAGNOSIS — M79.674 TOE PAIN, RIGHT: Primary | ICD-10-CM

## 2025-02-06 DIAGNOSIS — E11.9 DIABETES MELLITUS WITHOUT COMPLICATION (MULTI): ICD-10-CM

## 2025-02-06 DIAGNOSIS — B35.1 TINEA UNGUIUM: ICD-10-CM

## 2025-02-06 PROCEDURE — 1159F MED LIST DOCD IN RCRD: CPT | Performed by: PODIATRIST

## 2025-02-06 PROCEDURE — 1036F TOBACCO NON-USER: CPT | Performed by: PODIATRIST

## 2025-02-06 PROCEDURE — 1123F ACP DISCUSS/DSCN MKR DOCD: CPT | Performed by: PODIATRIST

## 2025-02-06 PROCEDURE — 1160F RVW MEDS BY RX/DR IN RCRD: CPT | Performed by: PODIATRIST

## 2025-02-06 PROCEDURE — 1157F ADVNC CARE PLAN IN RCRD: CPT | Performed by: PODIATRIST

## 2025-02-06 PROCEDURE — 11721 DEBRIDE NAIL 6 OR MORE: CPT | Performed by: PODIATRIST

## 2025-02-06 NOTE — PROGRESS NOTES
CC:  painful thickened and elongated toenails and diabetic care.      HPI: Pt presents for dm foot exam and painful thickened and elongated toenails that are difficult to manage.  Onset was gradual with worsening course until recently.  Aggravated by shoe gear and ambulation. Had some issues with swelling feet. Wears compression hose.        PCP: Dr. Shultz  Last visit: 12-19-24     PMH  Medical History           Past Medical History:   Diagnosis Date    Elevated prostate specific antigen (PSA) 02/23/2021     High prostate specific antigen (PSA)    Encounter for general adult medical examination without abnormal findings 09/29/2021     Medicare annual wellness visit, subsequent    Other conditions influencing health status 05/26/2021     Diabetes mellitus type 2, uncontrolled    Other specified symptoms and signs involving the circulatory and respiratory systems       Abdominal bruit    Personal history of other diseases of the nervous system and sense organs 12/01/2017     History of carpal tunnel syndrome    Personal history of other drug therapy 09/29/2021     History of influenza vaccination    Personal history of other endocrine, nutritional and metabolic disease       History of hyperkalemia    Personal history of other malignant neoplasm of skin       History of malignant neoplasm of skin         MEDS     Current Outpatient Medications:     acetaminophen (Tylenol) 500 mg capsule, Take 2 capsules (1,000 mg) by mouth every 6 hours if needed., Disp: , Rfl:     amLODIPine (Norvasc) 5 mg tablet, Take 1 tablet (5 mg) by mouth once daily., Disp: , Rfl:     ammonium lactate (Amlactin) 12 % cream, Apply topically if needed for dry skin. APPLY PER DIRECTED, Disp: , Rfl:     aspirin 81 mg EC tablet, Take 1 tablet (81 mg) by mouth once daily., Disp: , Rfl:     cholecalciferol (Vitamin D-3) 25 MCG (1000 UT) capsule, Take by mouth., Disp: , Rfl:     diclofenac sodium (Voltaren) 1 % gel gel, PER DIRECTED TRANSDERMAL, Disp: ,  Rfl:     gabapentin (Neurontin) 300 mg capsule, Take 1 capsule (300 mg) by mouth once daily., Disp: 90 capsule, Rfl: 1    simvastatin (Zocor) 40 mg tablet, TAKE ONE TABLET BY MOUTH EVERY DAY, Disp: 90 tablet, Rfl: 1    terazosin (Hytrin) 5 mg capsule, TAKE ONE CAPSULE BY MOUTH EVERY DAY, Disp: 90 capsule, Rfl: 1  Allergies  No Known Allergies  Social History   Social History                Socioeconomic History    Marital status:        Spouse name: Not on file    Number of children: 2    Years of education: Not on file    Highest education level: Not on file   Occupational History    Occupation: retired   Tobacco Use    Smoking status: Former       Packs/day: 1.00       Years: 30.00       Additional pack years: 0.00       Total pack years: 30.00       Types: Cigarettes       Quit date:        Years since quittin.1    Smokeless tobacco: Never   Vaping Use    Vaping Use: Never used   Substance and Sexual Activity    Alcohol use: Yes       Comment: special occasions    Drug use: Never    Sexual activity: Not Currently   Other Topics Concern    Not on file   Social History Narrative    Not on file      Social Determinants of Health      Financial Resource Strain: Not on file   Food Insecurity: Not on file   Transportation Needs: Not on file   Physical Activity: Not on file   Stress: Not on file   Social Connections: Not on file   Intimate Partner Violence: Not on file   Housing Stability: Not on file         Family History               Family History   Problem Relation Name Age of Onset    Arthritis Mother        Other (CARDIAC DISORDER) Mother        Diabetes Mother        Hypertension Mother        Diabetes Father        Diabetes Sister        Diabetes Brother        Diabetes Sibling             Surgical History             Past Surgical History:   Procedure Laterality Date    CARPAL TUNNEL RELEASE Bilateral 2017     Neuroplasty Decompression Median Nerve At Carpal Tunnel    CATARACT EXTRACTION    03/07/2017     Cataract Surgery    COLONOSCOPY   02/27/2019     Complete Colonoscopy            REVIEW OF SYSTEMS  DERM:   + as noted in HPI.         Physical examination:   On General Observation: Patient is a pleasant, cooperative, well developed 89 y.o. diabetic   adult male. The patient is alert and oriented to time, place and person. Patient has normal affect and mood.  /80      Vascular:  DP and PT pulses are 2/4 b/l.  mild edema noted. mild varicosities b/l.  CFT  5 seconds to all digits bilateral.  Skin temperature is warm to warm from proximal to distal bilateral.       Muscular: Strength is 5/5 for all instrinsic and extrinsic muscle groups.      Neuro:  Proprioception present.   Sensation to vibration is  intact. Protective sensation present  at all pedal sites via [place] Doc 5.07 monofilament bilateral.  Light touch intact bilateral.      Derm:    Left toenails: 1-5 Brittleness, crumbling upon debridement, subungual debris, elongation, mycotic appearance, tenderness, and thickness.   Right toenails: 1-5 Brittleness, crumbling upon debridement, subungual debris, elongation, mycotic appearance, tenderness, and thickness.   Hair growth is decreased b/l le     ASSESSMENT:       Tinea Unguium [B35.1]   E11.9  Pain in right toe(s) [M79.674]   Pain in left toe(s) [M79.675]         PLAN:      DM foot care and DM foot manifestations were reviewed.  The patient was educated on clinical findings, diagnosis and treatment plans. Patient understands all that has been explained and all questions were answered to apparent satisfaction.      - Debrided toenails 1-10 in length and height.   - Follow up in 9-12 weeks.

## 2025-02-20 PROBLEM — E66.811 CLASS 1 OBESITY WITH BODY MASS INDEX (BMI) OF 31.0 TO 31.9 IN ADULT: Status: ACTIVE | Noted: 2025-02-20

## 2025-03-04 LAB
25(OH)D3+25(OH)D2 SERPL-MCNC: 36 NG/ML (ref 30–100)
ALBUMIN SERPL-MCNC: 4.4 G/DL (ref 3.6–5.1)
BUN SERPL-MCNC: 29 MG/DL (ref 7–25)
BUN/CREAT SERPL: 15 (CALC) (ref 6–22)
CALCIUM SERPL-MCNC: 9 MG/DL (ref 8.6–10.3)
CHLORIDE SERPL-SCNC: 105 MMOL/L (ref 98–110)
CO2 SERPL-SCNC: 24 MMOL/L (ref 20–32)
CREAT SERPL-MCNC: 1.89 MG/DL (ref 0.7–1.22)
EGFRCR SERPLBLD CKD-EPI 2021: 33 ML/MIN/1.73M2
ERYTHROCYTE [DISTWIDTH] IN BLOOD BY AUTOMATED COUNT: 12.8 % (ref 11–15)
GLUCOSE SERPL-MCNC: 113 MG/DL (ref 65–99)
HCT VFR BLD AUTO: 34.8 % (ref 38.5–50)
HGB BLD-MCNC: 11.4 G/DL (ref 13.2–17.1)
MCH RBC QN AUTO: 31.8 PG (ref 27–33)
MCHC RBC AUTO-ENTMCNC: 32.8 G/DL (ref 32–36)
MCV RBC AUTO: 97.2 FL (ref 80–100)
PHOSPHATE SERPL-MCNC: 3.7 MG/DL (ref 2.1–4.3)
PLATELET # BLD AUTO: 211 THOUSAND/UL (ref 140–400)
PMV BLD REES-ECKER: 10.2 FL (ref 7.5–12.5)
POTASSIUM SERPL-SCNC: 4.6 MMOL/L (ref 3.5–5.3)
PTH-INTACT SERPL-MCNC: 62 PG/ML (ref 16–77)
RBC # BLD AUTO: 3.58 MILLION/UL (ref 4.2–5.8)
SODIUM SERPL-SCNC: 140 MMOL/L (ref 135–146)
WBC # BLD AUTO: 6.2 THOUSAND/UL (ref 3.8–10.8)

## 2025-03-14 ENCOUNTER — APPOINTMENT (OUTPATIENT)
Dept: CARDIOLOGY | Facility: CLINIC | Age: OVER 89
End: 2025-03-14
Payer: MEDICARE

## 2025-03-14 VITALS
DIASTOLIC BLOOD PRESSURE: 76 MMHG | HEART RATE: 65 BPM | BODY MASS INDEX: 30.22 KG/M2 | WEIGHT: 177 LBS | SYSTOLIC BLOOD PRESSURE: 144 MMHG | HEIGHT: 64 IN | OXYGEN SATURATION: 97 %

## 2025-03-14 DIAGNOSIS — I45.10 COMPLETE RIGHT BUNDLE BRANCH BLOCK: Primary | ICD-10-CM

## 2025-03-14 DIAGNOSIS — E78.00 PURE HYPERCHOLESTEROLEMIA: ICD-10-CM

## 2025-03-14 DIAGNOSIS — R00.1 BRADYCARDIA: ICD-10-CM

## 2025-03-14 DIAGNOSIS — I10 PRIMARY HYPERTENSION: ICD-10-CM

## 2025-03-14 DIAGNOSIS — N18.9 CHRONIC KIDNEY DISEASE, UNSPECIFIED CKD STAGE: ICD-10-CM

## 2025-03-14 DIAGNOSIS — E66.811 CLASS 1 OBESITY WITHOUT SERIOUS COMORBIDITY WITH BODY MASS INDEX (BMI) OF 31.0 TO 31.9 IN ADULT, UNSPECIFIED OBESITY TYPE: ICD-10-CM

## 2025-03-14 PROCEDURE — 3077F SYST BP >= 140 MM HG: CPT | Performed by: INTERNAL MEDICINE

## 2025-03-14 PROCEDURE — 93000 ELECTROCARDIOGRAM COMPLETE: CPT | Performed by: INTERNAL MEDICINE

## 2025-03-14 PROCEDURE — 1036F TOBACCO NON-USER: CPT | Performed by: INTERNAL MEDICINE

## 2025-03-14 PROCEDURE — 1160F RVW MEDS BY RX/DR IN RCRD: CPT | Performed by: INTERNAL MEDICINE

## 2025-03-14 PROCEDURE — 1123F ACP DISCUSS/DSCN MKR DOCD: CPT | Performed by: INTERNAL MEDICINE

## 2025-03-14 PROCEDURE — 99214 OFFICE O/P EST MOD 30 MIN: CPT | Performed by: INTERNAL MEDICINE

## 2025-03-14 PROCEDURE — 3078F DIAST BP <80 MM HG: CPT | Performed by: INTERNAL MEDICINE

## 2025-03-14 PROCEDURE — 1159F MED LIST DOCD IN RCRD: CPT | Performed by: INTERNAL MEDICINE

## 2025-03-14 PROCEDURE — 1157F ADVNC CARE PLAN IN RCRD: CPT | Performed by: INTERNAL MEDICINE

## 2025-03-14 NOTE — PROGRESS NOTES
"  Subjective  Nik Washington  is a 89 y.o. year old male who presents for RBBB, HTN.  Notes back pain.  No chest pain, no dyspnea, no palpiations, noedema.  Home BP' around 145/62    Blood pressure 144/76, pulse 65, height 1.626 m (5' 4\"), weight 80.3 kg (177 lb), SpO2 97%.   Patient has no known allergies.  Past Medical History:   Diagnosis Date    Class 1 obesity without serious comorbidity with body mass index (BMI) of 30.0 to 30.9 in adult 03/08/2024    Elevated prostate specific antigen (PSA) 02/23/2021    High prostate specific antigen (PSA)    Encounter for general adult medical examination without abnormal findings 09/29/2021    Medicare annual wellness visit, subsequent    Other conditions influencing health status 05/26/2021    Diabetes mellitus type 2, uncontrolled    Other specified symptoms and signs involving the circulatory and respiratory systems     Abdominal bruit    Personal history of other diseases of the nervous system and sense organs 12/01/2017    History of carpal tunnel syndrome    Personal history of other drug therapy 09/29/2021    History of influenza vaccination    Personal history of other endocrine, nutritional and metabolic disease     History of hyperkalemia    Personal history of other malignant neoplasm of skin     History of malignant neoplasm of skin     Past Surgical History:   Procedure Laterality Date    CARPAL TUNNEL RELEASE Bilateral 01/26/2017    Neuroplasty Decompression Median Nerve At Carpal Tunnel    CATARACT EXTRACTION  03/07/2017    Cataract Surgery    COLONOSCOPY  02/27/2019    Complete Colonoscopy     Family History   Problem Relation Name Age of Onset    Arthritis Mother      Other (CARDIAC DISORDER) Mother      Diabetes Mother      Hypertension Mother      Diabetes Father      Diabetes Sister      Diabetes Brother      Diabetes Sibling       @SOC    Current Outpatient Medications   Medication Sig Dispense Refill    acetaminophen (Tylenol) 500 mg capsule Take " 2 capsules (1,000 mg) by mouth every 6 hours if needed.      amLODIPine (Norvasc) 5 mg tablet Take 1 tablet (5 mg) by mouth once daily.      aspirin 81 mg EC tablet Take 1 tablet (81 mg) by mouth once daily.      blood sugar diagnostic (Accu-Chek Guide test strips) strip 1 strip once daily. 100 strip 3    calcitriol (Rocaltrol) 0.25 mcg capsule Take 1 capsule (0.25 mcg) by mouth every other day.      cholecalciferol (Vitamin D-3) 25 MCG (1000 UT) capsule Take by mouth.      diclofenac sodium (Voltaren) 1 % gel gel PER DIRECTED TRANSDERMAL      lancets (Accu-Chek Fastclix Lancet Drum) misc 1 each once daily. 100 each 3    simvastatin (Zocor) 40 mg tablet TAKE ONE TABLET BY MOUTH EVERY DAY 90 tablet 1    terazosin (Hytrin) 5 mg capsule TAKE ONE CAPSULE BY MOUTH EVERY DAY 90 capsule 1     No current facility-administered medications for this visit.        ROS  Review of Systems   All other systems reviewed and are negative.      Physical Exam  Physical Exam  Constitutional:       Appearance: Normal appearance.   HENT:      Head: Normocephalic and atraumatic.   Cardiovascular:      Rate and Rhythm: Normal rate and regular rhythm.      Comments: S2 widely split  Pulmonary:      Effort: Pulmonary effort is normal.      Breath sounds: Normal breath sounds.   Abdominal:      General: Abdomen is flat.   Musculoskeletal:      Right lower leg: No edema.      Left lower leg: No edema.   Skin:     General: Skin is warm and dry.   Neurological:      General: No focal deficit present.      Mental Status: He is alert and oriented to person, place, and time.   Psychiatric:         Mood and Affect: Mood normal.         Behavior: Behavior normal.          EKG  Encounter Date: 09/20/24   ECG 12 Lead    Narrative    NSR at 65/min., RBBB       Problem List Items Addressed This Visit       Chronic kidney disease    Complete right bundle branch block - Primary    Hyperlipidemia    Hypertension    Class 1 obesity with body mass index (BMI)  of 31.0 to 31.9 in adult     Other Visit Diagnoses       Bradycardia        Relevant Orders    ECG 12 Lead              Same meds  Return 6 months with EKG      Carl Coello MD

## 2025-03-20 ENCOUNTER — APPOINTMENT (OUTPATIENT)
Dept: PRIMARY CARE | Facility: CLINIC | Age: OVER 89
End: 2025-03-20
Payer: MEDICARE

## 2025-03-20 VITALS
DIASTOLIC BLOOD PRESSURE: 66 MMHG | RESPIRATION RATE: 16 BRPM | BODY MASS INDEX: 30.3 KG/M2 | HEART RATE: 79 BPM | OXYGEN SATURATION: 97 % | SYSTOLIC BLOOD PRESSURE: 142 MMHG | WEIGHT: 176.5 LBS

## 2025-03-20 DIAGNOSIS — M48.062 SPINAL STENOSIS, LUMBAR REGION, WITH NEUROGENIC CLAUDICATION: ICD-10-CM

## 2025-03-20 DIAGNOSIS — C64.9 RENAL CELL CARCINOMA, UNSPECIFIED LATERALITY (MULTI): ICD-10-CM

## 2025-03-20 DIAGNOSIS — I25.10 ARTERIOSCLEROTIC CARDIOVASCULAR DISEASE: ICD-10-CM

## 2025-03-20 DIAGNOSIS — N18.32 STAGE 3B CHRONIC KIDNEY DISEASE (MULTI): ICD-10-CM

## 2025-03-20 DIAGNOSIS — D63.1 ANEMIA DUE TO STAGE 3B CHRONIC KIDNEY DISEASE (MULTI): ICD-10-CM

## 2025-03-20 DIAGNOSIS — E11.9 DIABETES MELLITUS WITHOUT COMPLICATION (MULTI): Primary | ICD-10-CM

## 2025-03-20 DIAGNOSIS — N18.32 ANEMIA DUE TO STAGE 3B CHRONIC KIDNEY DISEASE (MULTI): ICD-10-CM

## 2025-03-20 DIAGNOSIS — I10 PRIMARY HYPERTENSION: ICD-10-CM

## 2025-03-20 DIAGNOSIS — E78.2 MIXED HYPERLIPIDEMIA: ICD-10-CM

## 2025-03-20 LAB — POC HEMOGLOBIN A1C: 7.1 % (ref 4.2–6.5)

## 2025-03-20 PROCEDURE — 99214 OFFICE O/P EST MOD 30 MIN: CPT | Performed by: INTERNAL MEDICINE

## 2025-03-20 PROCEDURE — 1159F MED LIST DOCD IN RCRD: CPT | Performed by: INTERNAL MEDICINE

## 2025-03-20 PROCEDURE — 3077F SYST BP >= 140 MM HG: CPT | Performed by: INTERNAL MEDICINE

## 2025-03-20 PROCEDURE — 1157F ADVNC CARE PLAN IN RCRD: CPT | Performed by: INTERNAL MEDICINE

## 2025-03-20 PROCEDURE — 1160F RVW MEDS BY RX/DR IN RCRD: CPT | Performed by: INTERNAL MEDICINE

## 2025-03-20 PROCEDURE — 1123F ACP DISCUSS/DSCN MKR DOCD: CPT | Performed by: INTERNAL MEDICINE

## 2025-03-20 PROCEDURE — 3078F DIAST BP <80 MM HG: CPT | Performed by: INTERNAL MEDICINE

## 2025-03-20 PROCEDURE — 83036 HEMOGLOBIN GLYCOSYLATED A1C: CPT | Performed by: INTERNAL MEDICINE

## 2025-03-20 RX ORDER — GLIMEPIRIDE 1 MG/1
TABLET ORAL
Qty: 45 TABLET | Refills: 1 | Status: SHIPPED | OUTPATIENT
Start: 2025-03-20

## 2025-03-20 RX ORDER — GLIMEPIRIDE 1 MG/1
TABLET ORAL
Start: 2025-03-20 | End: 2025-03-20 | Stop reason: SDUPTHER

## 2025-03-20 ASSESSMENT — ENCOUNTER SYMPTOMS
FEVER: 0
BACK PAIN: 1
UNEXPECTED WEIGHT CHANGE: 0
CHILLS: 0
APPETITE CHANGE: 0
ACTIVITY CHANGE: 0
FATIGUE: 0

## 2025-03-20 ASSESSMENT — PATIENT HEALTH QUESTIONNAIRE - PHQ9
2. FEELING DOWN, DEPRESSED OR HOPELESS: NOT AT ALL
1. LITTLE INTEREST OR PLEASURE IN DOING THINGS: NOT AT ALL
SUM OF ALL RESPONSES TO PHQ9 QUESTIONS 1 AND 2: 0

## 2025-03-20 NOTE — PATIENT INSTRUCTIONS
Start to take 1/2 tablet of Glimepiride daily if able to tolerate  Continue all meds as directed-call when refills needed  Continue to see specialist as directed  Do the physical therapy exercises that you were taught to help with balance at home as directed  Continue healthy diet-lean protein/fish/veggies and exercise as able   Follow up in 6 months for full physical

## 2025-03-20 NOTE — PROGRESS NOTES
Subjective   Patient ID: Nik Washington is a 89 y.o. male who presents for Follow-up (3 month ).    HPI  Pt here for follow up.  Appetite is good.  His weight is stable.      He saw nephrology on Monday and had blood work.  He was told kidney function had improved slightly.  Labs noted-GFR was 33 on 3/3/25.  He had cbc that showed hemoglobin of 11.4.      He saw cardiology last week-he had EKG but no changes otherwise.  He will see him again in 6 months.      He sees derm and they did remove skin lesion on his scalp.      He also had podiatry appt since our last visit.      He sees urology for known renal cell CA-only doing observation at this time.     She has chronic back pain.  He did go through some PT for his balance.      Review of Systems   Constitutional:  Negative for activity change, appetite change, chills, fatigue, fever and unexpected weight change.   Musculoskeletal:  Positive for back pain.       Objective   /66 (BP Location: Left arm, Patient Position: Sitting)   Pulse 79   Resp 16   Wt 80.1 kg (176 lb 8 oz)   SpO2 97%   BMI 30.30 kg/m²    Physical Exam  Constitutional:       Appearance: Normal appearance.   Cardiovascular:      Rate and Rhythm: Normal rate and regular rhythm.      Heart sounds: Normal heart sounds.   Pulmonary:      Effort: Pulmonary effort is normal.      Breath sounds: Normal breath sounds.   Abdominal:      General: Bowel sounds are normal.   Musculoskeletal:      Right lower leg: Edema (1+ pitting edema) present.      Left lower leg: Edema (1+ pitting edema) present.   Lymphadenopathy:      Cervical: No cervical adenopathy.   Neurological:      Mental Status: He is alert and oriented to person, place, and time.   Psychiatric:         Mood and Affect: Mood normal.         Assessment/Plan   Problem List Items Addressed This Visit       Arteriosclerotic cardiovascular disease     Saw cardio recently  On baby ASA/statin therapy          Relevant Orders    Follow Up In  Primary Care - Medicare Annual    Chronic kidney disease    Relevant Orders    Follow Up In Primary Care - Medicare Annual    Hyperlipidemia     On statin therapy  LDL 64 in 9/2024         Relevant Orders    Follow Up In Primary Care - Medicare Annual    Hypertension     BP a bit high here; gets better readings at home   On Amlodipine  Sees nephrology          Relevant Orders    Follow Up In Primary Care - Medicare Annual    Spinal stenosis, lumbar region, with neurogenic claudication     Uses Tylenol ES TID for pain control  Doing home PT for balance            Relevant Orders    Follow Up In Primary Care - Medicare Annual    Anemia due to stage 3b chronic kidney disease (Multi)     Blood counts stable when compared to 6 months ago          Relevant Orders    Follow Up In Primary Care - Medicare Annual    Diabetes mellitus without complication (Multi) - Primary     Taking 0.25 mg of Glimepiride every other day at present  Repeat A1c here in office -7.1% which is good overall for age  He can take 1/4-1/2 tablet of the Glimepiride daily to help maintain levels         Relevant Medications    glimepiride (AmaryL) 1 mg tablet    Other Relevant Orders    POCT glycosylated hemoglobin (Hb A1C) manually resulted (Completed)    Follow Up In Primary Care - Medicare Annual    Renal cell carcinoma (Multi)     Sees urology  Observation only at this time          Relevant Orders    Follow Up In Primary Care - Medicare Annual

## 2025-04-07 DIAGNOSIS — E11.9 DIABETES MELLITUS WITHOUT COMPLICATION: ICD-10-CM

## 2025-04-07 RX ORDER — BLOOD SUGAR DIAGNOSTIC
STRIP MISCELLANEOUS
Qty: 100 EACH | Refills: 1 | Status: SHIPPED | OUTPATIENT
Start: 2025-04-07

## 2025-05-02 ENCOUNTER — APPOINTMENT (OUTPATIENT)
Dept: CARDIOLOGY | Facility: HOSPITAL | Age: OVER 89
DRG: 871 | End: 2025-05-02
Payer: MEDICARE

## 2025-05-02 ENCOUNTER — APPOINTMENT (OUTPATIENT)
Dept: RADIOLOGY | Facility: HOSPITAL | Age: OVER 89
DRG: 871 | End: 2025-05-02
Payer: MEDICARE

## 2025-05-02 ENCOUNTER — HOSPITAL ENCOUNTER (INPATIENT)
Facility: HOSPITAL | Age: OVER 89
LOS: 2 days | Discharge: HOSPICE | End: 2025-05-04
Attending: EMERGENCY MEDICINE | Admitting: SURGERY
Payer: MEDICARE

## 2025-05-02 DIAGNOSIS — A41.9 SEPSIS WITH ACUTE HYPOXIC RESPIRATORY FAILURE WITHOUT SEPTIC SHOCK, DUE TO UNSPECIFIED ORGANISM: ICD-10-CM

## 2025-05-02 DIAGNOSIS — R09.02 HYPOXIA: Primary | ICD-10-CM

## 2025-05-02 DIAGNOSIS — I21.4 NSTEMI (NON-ST ELEVATED MYOCARDIAL INFARCTION) (MULTI): ICD-10-CM
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
## 2025-05-02 DIAGNOSIS — J96.01 SEPSIS WITH ACUTE HYPOXIC RESPIRATORY FAILURE WITHOUT SEPTIC SHOCK, DUE TO UNSPECIFIED ORGANISM: ICD-10-CM

## 2025-05-02 DIAGNOSIS — R65.20 SEPSIS WITH ACUTE HYPOXIC RESPIRATORY FAILURE WITHOUT SEPTIC SHOCK, DUE TO UNSPECIFIED ORGANISM: ICD-10-CM

## 2025-05-02 DIAGNOSIS — R41.82 ALTERED MENTAL STATUS, UNSPECIFIED ALTERED MENTAL STATUS TYPE: ICD-10-CM

## 2025-05-02 DIAGNOSIS — R79.89 OTHER SPECIFIED ABNORMAL FINDINGS OF BLOOD CHEMISTRY: ICD-10-CM

## 2025-05-02 LAB
ALBUMIN SERPL BCP-MCNC: 3.4 G/DL (ref 3.4–5)
ALBUMIN SERPL BCP-MCNC: 4 G/DL (ref 3.4–5)
ALP SERPL-CCNC: 21 U/L (ref 33–136)
ALT SERPL W P-5'-P-CCNC: 76 U/L (ref 10–52)
ANION GAP BLDA CALCULATED.4IONS-SCNC: 16 MMO/L (ref 10–25)
ANION GAP BLDA CALCULATED.4IONS-SCNC: 17 MMO/L (ref 10–25)
ANION GAP BLDV CALCULATED.4IONS-SCNC: 14 MMOL/L (ref 10–25)
ANION GAP SERPL CALC-SCNC: 18 MMOL/L (ref 10–20)
ANION GAP SERPL CALC-SCNC: 19 MMOL/L (ref 10–20)
APAP SERPL-MCNC: <10 UG/ML (ref ?–30)
APTT PPP: 31 SECONDS (ref 26–36)
AST SERPL W P-5'-P-CCNC: 404 U/L (ref 9–39)
ATRIAL RATE: 111 BPM
ATRIAL RATE: 113 BPM
BASE EXCESS BLDA CALC-SCNC: -7.1 MMOL/L (ref -2–3)
BASE EXCESS BLDA CALC-SCNC: -7.9 MMOL/L (ref -2–3)
BASE EXCESS BLDV CALC-SCNC: -3 MMOL/L (ref -2–3)
BASOPHILS # BLD MANUAL: 0 X10*3/UL (ref 0–0.1)
BASOPHILS NFR BLD MANUAL: 0 %
BILIRUB SERPL-MCNC: 1 MG/DL (ref 0–1.2)
BNP SERPL-MCNC: 939 PG/ML (ref 0–99)
BODY TEMPERATURE: 37 DEGREES CELSIUS
BUN SERPL-MCNC: 40 MG/DL (ref 6–23)
BUN SERPL-MCNC: 49 MG/DL (ref 6–23)
BURR CELLS BLD QL SMEAR: ABNORMAL
CA-I BLDA-SCNC: 1.07 MMOL/L (ref 1.1–1.33)
CA-I BLDA-SCNC: 1.1 MMOL/L (ref 1.1–1.33)
CA-I BLDV-SCNC: 1.15 MMOL/L (ref 1.1–1.33)
CALCIUM SERPL-MCNC: 7.7 MG/DL (ref 8.6–10.3)
CALCIUM SERPL-MCNC: 8.9 MG/DL (ref 8.6–10.3)
CARDIAC TROPONIN I PNL SERPL HS: 1970 NG/L (ref 0–20)
CARDIAC TROPONIN I PNL SERPL HS: 3981 NG/L (ref 0–20)
CARDIAC TROPONIN I PNL SERPL HS: ABNORMAL NG/L (ref 0–20)
CARDIAC TROPONIN I PNL SERPL HS: ABNORMAL NG/L (ref 0–20)
CHLORIDE BLDA-SCNC: 100 MMOL/L (ref 98–107)
CHLORIDE BLDA-SCNC: 100 MMOL/L (ref 98–107)
CHLORIDE BLDV-SCNC: 103 MMOL/L (ref 98–107)
CHLORIDE SERPL-SCNC: 102 MMOL/L (ref 98–107)
CHLORIDE SERPL-SCNC: 104 MMOL/L (ref 98–107)
CO2 SERPL-SCNC: 17 MMOL/L (ref 21–32)
CO2 SERPL-SCNC: 18 MMOL/L (ref 21–32)
CREAT SERPL-MCNC: 2.25 MG/DL (ref 0.5–1.3)
CREAT SERPL-MCNC: 2.92 MG/DL (ref 0.5–1.3)
EGFRCR SERPLBLD CKD-EPI 2021: 20 ML/MIN/1.73M*2
EGFRCR SERPLBLD CKD-EPI 2021: 27 ML/MIN/1.73M*2
EOSINOPHIL # BLD MANUAL: 0 X10*3/UL (ref 0–0.4)
EOSINOPHIL NFR BLD MANUAL: 0 %
ERYTHROCYTE [DISTWIDTH] IN BLOOD BY AUTOMATED COUNT: 13.2 % (ref 11.5–14.5)
ETHANOL SERPL-MCNC: <10 MG/DL
FLUAV RNA RESP QL NAA+PROBE: NOT DETECTED
FLUBV RNA RESP QL NAA+PROBE: NOT DETECTED
GLUCOSE BLD MANUAL STRIP-MCNC: 139 MG/DL (ref 74–99)
GLUCOSE BLD MANUAL STRIP-MCNC: 150 MG/DL (ref 74–99)
GLUCOSE BLDA-MCNC: 156 MG/DL (ref 74–99)
GLUCOSE BLDA-MCNC: 168 MG/DL (ref 74–99)
GLUCOSE BLDV-MCNC: 179 MG/DL (ref 74–99)
GLUCOSE SERPL-MCNC: 157 MG/DL (ref 74–99)
GLUCOSE SERPL-MCNC: 162 MG/DL (ref 74–99)
HCO3 BLDA-SCNC: 15.7 MMOL/L (ref 22–26)
HCO3 BLDA-SCNC: 16.6 MMOL/L (ref 22–26)
HCO3 BLDV-SCNC: 22 MMOL/L (ref 22–26)
HCT VFR BLD AUTO: 34.9 % (ref 41–52)
HCT VFR BLD EST: 34 % (ref 41–52)
HCT VFR BLD EST: 36 % (ref 41–52)
HCT VFR BLD EST: 39 % (ref 41–52)
HGB BLD-MCNC: 11.5 G/DL (ref 13.5–17.5)
HGB BLDA-MCNC: 11.2 G/DL (ref 13.5–17.5)
HGB BLDA-MCNC: 13.1 G/DL (ref 13.5–17.5)
HGB BLDV-MCNC: 12 G/DL (ref 13.5–17.5)
IMM GRANULOCYTES # BLD AUTO: 0.16 X10*3/UL (ref 0–0.5)
IMM GRANULOCYTES NFR BLD AUTO: 1 % (ref 0–0.9)
INHALED O2 CONCENTRATION: 21 %
INHALED O2 CONCENTRATION: 60 %
INHALED O2 CONCENTRATION: 70 %
INR PPP: 1.1 (ref 0.9–1.1)
LACTATE BLDA-SCNC: 3.3 MMOL/L (ref 0.4–2)
LACTATE BLDA-SCNC: 3.8 MMOL/L (ref 0.4–2)
LACTATE BLDV-SCNC: 5.1 MMOL/L (ref 0.4–2)
LACTATE BLDV-SCNC: 5.5 MMOL/L (ref 0.4–2)
LACTATE SERPL-SCNC: 4.6 MMOL/L (ref 0.4–2)
LACTATE SERPL-SCNC: 4.8 MMOL/L (ref 0.4–2)
LACTATE SERPL-SCNC: 5.2 MMOL/L (ref 0.4–2)
LACTATE SERPL-SCNC: 5.3 MMOL/L (ref 0.4–2)
LIPASE SERPL-CCNC: 15 U/L (ref 9–82)
LYMPHOCYTES # BLD MANUAL: 0.62 X10*3/UL (ref 0.8–3)
LYMPHOCYTES NFR BLD MANUAL: 4 %
MAGNESIUM SERPL-MCNC: 1.7 MG/DL (ref 1.6–2.4)
MCH RBC QN AUTO: 30.5 PG (ref 26–34)
MCHC RBC AUTO-ENTMCNC: 33 G/DL (ref 32–36)
MCV RBC AUTO: 93 FL (ref 80–100)
METAMYELOCYTES # BLD MANUAL: 0.31 X10*3/UL
METAMYELOCYTES NFR BLD MANUAL: 2 %
MONOCYTES # BLD MANUAL: 0.16 X10*3/UL (ref 0.05–0.8)
MONOCYTES NFR BLD MANUAL: 1 %
MRSA DNA SPEC QL NAA+PROBE: NOT DETECTED
MRSA DNA SPEC QL NAA+PROBE: NOT DETECTED
MYELOCYTES # BLD MANUAL: 0.16 X10*3/UL
MYELOCYTES NFR BLD MANUAL: 1 %
NEUTROPHILS # BLD MANUAL: 14.36 X10*3/UL (ref 1.6–5.5)
NEUTS BAND # BLD MANUAL: 1.72 X10*3/UL (ref 0–0.5)
NEUTS BAND NFR BLD MANUAL: 11 %
NEUTS SEG # BLD MANUAL: 12.64 X10*3/UL (ref 1.6–5)
NEUTS SEG NFR BLD MANUAL: 81 %
NRBC BLD-RTO: 0 /100 WBCS (ref 0–0)
OXYHGB MFR BLDA: 96.3 % (ref 94–98)
OXYHGB MFR BLDA: 97.5 % (ref 94–98)
OXYHGB MFR BLDV: 41.2 % (ref 45–75)
P AXIS: -16 DEGREES
P OFFSET: 178 MS
P OFFSET: 178 MS
P ONSET: 136 MS
P ONSET: 148 MS
PCO2 BLDA: 26 MM HG (ref 38–42)
PCO2 BLDA: 28 MM HG (ref 38–42)
PCO2 BLDV: 38 MM HG (ref 41–51)
PH BLDA: 7.38 PH (ref 7.38–7.42)
PH BLDA: 7.39 PH (ref 7.38–7.42)
PH BLDV: 7.37 PH (ref 7.33–7.43)
PHOSPHATE SERPL-MCNC: 4.2 MG/DL (ref 2.5–4.9)
PLATELET # BLD AUTO: 148 X10*3/UL (ref 150–450)
PO2 BLDA: 79 MM HG (ref 85–95)
PO2 BLDA: 99 MM HG (ref 85–95)
PO2 BLDV: 27 MM HG (ref 35–45)
POTASSIUM BLDA-SCNC: 4 MMOL/L (ref 3.5–5.3)
POTASSIUM BLDA-SCNC: 4.4 MMOL/L (ref 3.5–5.3)
POTASSIUM BLDV-SCNC: 4.1 MMOL/L (ref 3.5–5.3)
POTASSIUM SERPL-SCNC: 3.9 MMOL/L (ref 3.5–5.3)
POTASSIUM SERPL-SCNC: 4 MMOL/L (ref 3.5–5.3)
PR INTERVAL: 164 MS
PR INTERVAL: 186 MS
PROT SERPL-MCNC: 6.2 G/DL (ref 6.4–8.2)
PROTHROMBIN TIME: 12.5 SECONDS (ref 9.8–12.4)
Q ONSET: 229 MS
Q ONSET: 230 MS
QRS COUNT: 18 BEATS
QRS COUNT: 19 BEATS
QRS DURATION: 112 MS
QRS DURATION: 126 MS
QT INTERVAL: 320 MS
QT INTERVAL: 322 MS
QTC CALCULATION(BAZETT): 435 MS
QTC CALCULATION(BAZETT): 441 MS
QTC FREDERICIA: 393 MS
QTC FREDERICIA: 397 MS
R AXIS: 24 DEGREES
R AXIS: 58 DEGREES
RBC # BLD AUTO: 3.77 X10*6/UL (ref 4.5–5.9)
RBC MORPH BLD: ABNORMAL
SALICYLATES SERPL-MCNC: <3 MG/DL (ref ?–20)
SAO2 % BLDA: 99 % (ref 94–100)
SAO2 % BLDA: 99 % (ref 94–100)
SAO2 % BLDV: 42 % (ref 45–75)
SARS-COV-2 RNA RESP QL NAA+PROBE: NOT DETECTED
SODIUM BLDA-SCNC: 128 MMOL/L (ref 136–145)
SODIUM BLDA-SCNC: 129 MMOL/L (ref 136–145)
SODIUM BLDV-SCNC: 135 MMOL/L (ref 136–145)
SODIUM SERPL-SCNC: 133 MMOL/L (ref 136–145)
SODIUM SERPL-SCNC: 137 MMOL/L (ref 136–145)
T AXIS: -1 DEGREES
T AXIS: -11 DEGREES
T OFFSET: 390 MS
T OFFSET: 390 MS
TOTAL CELLS COUNTED BLD: 100
UFH PPP CHRO-ACNC: 0.6 IU/ML (ref ?–1.1)
VENTRICULAR RATE: 111 BPM
VENTRICULAR RATE: 113 BPM
WBC # BLD AUTO: 15.6 X10*3/UL (ref 4.4–11.3)

## 2025-05-02 PROCEDURE — 96375 TX/PRO/DX INJ NEW DRUG ADDON: CPT | Mod: 59

## 2025-05-02 PROCEDURE — 72125 CT NECK SPINE W/O DYE: CPT | Performed by: RADIOLOGY

## 2025-05-02 PROCEDURE — 84132 ASSAY OF SERUM POTASSIUM: CPT | Performed by: EMERGENCY MEDICINE

## 2025-05-02 PROCEDURE — 96368 THER/DIAG CONCURRENT INF: CPT

## 2025-05-02 PROCEDURE — 99291 CRITICAL CARE FIRST HOUR: CPT | Performed by: EMERGENCY MEDICINE

## 2025-05-02 PROCEDURE — 84132 ASSAY OF SERUM POTASSIUM: CPT | Performed by: INTERNAL MEDICINE

## 2025-05-02 PROCEDURE — 96365 THER/PROPH/DIAG IV INF INIT: CPT | Mod: 59

## 2025-05-02 PROCEDURE — 76937 US GUIDE VASCULAR ACCESS: CPT

## 2025-05-02 PROCEDURE — 83880 ASSAY OF NATRIURETIC PEPTIDE: CPT | Performed by: EMERGENCY MEDICINE

## 2025-05-02 PROCEDURE — 84484 ASSAY OF TROPONIN QUANT: CPT | Performed by: EMERGENCY MEDICINE

## 2025-05-02 PROCEDURE — 87636 SARSCOV2 & INF A&B AMP PRB: CPT | Performed by: EMERGENCY MEDICINE

## 2025-05-02 PROCEDURE — 71045 X-RAY EXAM CHEST 1 VIEW: CPT

## 2025-05-02 PROCEDURE — 84484 ASSAY OF TROPONIN QUANT: CPT | Performed by: SURGERY

## 2025-05-02 PROCEDURE — 83735 ASSAY OF MAGNESIUM: CPT

## 2025-05-02 PROCEDURE — 99285 EMERGENCY DEPT VISIT HI MDM: CPT | Mod: 25 | Performed by: EMERGENCY MEDICINE

## 2025-05-02 PROCEDURE — 70450 CT HEAD/BRAIN W/O DYE: CPT | Performed by: RADIOLOGY

## 2025-05-02 PROCEDURE — 36600 WITHDRAWAL OF ARTERIAL BLOOD: CPT

## 2025-05-02 PROCEDURE — 85007 BL SMEAR W/DIFF WBC COUNT: CPT | Performed by: EMERGENCY MEDICINE

## 2025-05-02 PROCEDURE — 83605 ASSAY OF LACTIC ACID: CPT | Performed by: EMERGENCY MEDICINE

## 2025-05-02 PROCEDURE — 83605 ASSAY OF LACTIC ACID: CPT

## 2025-05-02 PROCEDURE — 2500000002 HC RX 250 W HCPCS SELF ADMINISTERED DRUGS (ALT 637 FOR MEDICARE OP, ALT 636 FOR OP/ED): Performed by: SURGERY

## 2025-05-02 PROCEDURE — 82435 ASSAY OF BLOOD CHLORIDE: CPT | Performed by: INTERNAL MEDICINE

## 2025-05-02 PROCEDURE — 72131 CT LUMBAR SPINE W/O DYE: CPT | Performed by: RADIOLOGY

## 2025-05-02 PROCEDURE — 74176 CT ABD & PELVIS W/O CONTRAST: CPT | Performed by: RADIOLOGY

## 2025-05-02 PROCEDURE — 72128 CT CHEST SPINE W/O DYE: CPT

## 2025-05-02 PROCEDURE — 36415 COLL VENOUS BLD VENIPUNCTURE: CPT | Performed by: EMERGENCY MEDICINE

## 2025-05-02 PROCEDURE — 93005 ELECTROCARDIOGRAM TRACING: CPT

## 2025-05-02 PROCEDURE — 72128 CT CHEST SPINE W/O DYE: CPT | Performed by: RADIOLOGY

## 2025-05-02 PROCEDURE — 96366 THER/PROPH/DIAG IV INF ADDON: CPT

## 2025-05-02 PROCEDURE — 87040 BLOOD CULTURE FOR BACTERIA: CPT | Performed by: EMERGENCY MEDICINE

## 2025-05-02 PROCEDURE — 2500000001 HC RX 250 WO HCPCS SELF ADMINISTERED DRUGS (ALT 637 FOR MEDICARE OP)

## 2025-05-02 PROCEDURE — 83605 ASSAY OF LACTIC ACID: CPT | Performed by: INTERNAL MEDICINE

## 2025-05-02 PROCEDURE — 2500000004 HC RX 250 GENERAL PHARMACY W/ HCPCS (ALT 636 FOR OP/ED)

## 2025-05-02 PROCEDURE — 71250 CT THORAX DX C-: CPT | Performed by: RADIOLOGY

## 2025-05-02 PROCEDURE — 2550000001 HC RX 255 CONTRASTS: Performed by: EMERGENCY MEDICINE

## 2025-05-02 PROCEDURE — 2500000004 HC RX 250 GENERAL PHARMACY W/ HCPCS (ALT 636 FOR OP/ED): Mod: JZ | Performed by: EMERGENCY MEDICINE

## 2025-05-02 PROCEDURE — 72170 X-RAY EXAM OF PELVIS: CPT | Performed by: RADIOLOGY

## 2025-05-02 PROCEDURE — 2500000004 HC RX 250 GENERAL PHARMACY W/ HCPCS (ALT 636 FOR OP/ED): Mod: JZ | Performed by: SURGERY

## 2025-05-02 PROCEDURE — 2020000001 HC ICU ROOM DAILY

## 2025-05-02 PROCEDURE — 71250 CT THORAX DX C-: CPT

## 2025-05-02 PROCEDURE — 2500000004 HC RX 250 GENERAL PHARMACY W/ HCPCS (ALT 636 FOR OP/ED): Mod: JZ

## 2025-05-02 PROCEDURE — 5A09357 ASSISTANCE WITH RESPIRATORY VENTILATION, LESS THAN 24 CONSECUTIVE HOURS, CONTINUOUS POSITIVE AIRWAY PRESSURE: ICD-10-PCS | Performed by: SURGERY

## 2025-05-02 PROCEDURE — 84132 ASSAY OF SERUM POTASSIUM: CPT

## 2025-05-02 PROCEDURE — 83690 ASSAY OF LIPASE: CPT | Performed by: EMERGENCY MEDICINE

## 2025-05-02 PROCEDURE — 82330 ASSAY OF CALCIUM: CPT | Performed by: EMERGENCY MEDICINE

## 2025-05-02 PROCEDURE — 84520 ASSAY OF UREA NITROGEN: CPT

## 2025-05-02 PROCEDURE — 80320 DRUG SCREEN QUANTALCOHOLS: CPT | Performed by: EMERGENCY MEDICINE

## 2025-05-02 PROCEDURE — 96361 HYDRATE IV INFUSION ADD-ON: CPT

## 2025-05-02 PROCEDURE — 2500000001 HC RX 250 WO HCPCS SELF ADMINISTERED DRUGS (ALT 637 FOR MEDICARE OP): Performed by: EMERGENCY MEDICINE

## 2025-05-02 PROCEDURE — 2500000005 HC RX 250 GENERAL PHARMACY W/O HCPCS

## 2025-05-02 PROCEDURE — 71045 X-RAY EXAM CHEST 1 VIEW: CPT | Performed by: STUDENT IN AN ORGANIZED HEALTH CARE EDUCATION/TRAINING PROGRAM

## 2025-05-02 PROCEDURE — 73090 X-RAY EXAM OF FOREARM: CPT | Mod: RIGHT SIDE | Performed by: RADIOLOGY

## 2025-05-02 PROCEDURE — 85730 THROMBOPLASTIN TIME PARTIAL: CPT | Performed by: EMERGENCY MEDICINE

## 2025-05-02 PROCEDURE — 87641 MR-STAPH DNA AMP PROBE: CPT | Performed by: SURGERY

## 2025-05-02 PROCEDURE — 85610 PROTHROMBIN TIME: CPT | Performed by: EMERGENCY MEDICINE

## 2025-05-02 PROCEDURE — 71275 CT ANGIOGRAPHY CHEST: CPT | Performed by: RADIOLOGY

## 2025-05-02 PROCEDURE — 2500000005 HC RX 250 GENERAL PHARMACY W/O HCPCS: Performed by: SURGERY

## 2025-05-02 PROCEDURE — 96367 TX/PROPH/DG ADDL SEQ IV INF: CPT

## 2025-05-02 PROCEDURE — 73090 X-RAY EXAM OF FOREARM: CPT | Mod: RT

## 2025-05-02 PROCEDURE — 72131 CT LUMBAR SPINE W/O DYE: CPT

## 2025-05-02 PROCEDURE — 2500000004 HC RX 250 GENERAL PHARMACY W/ HCPCS (ALT 636 FOR OP/ED): Mod: JZ | Performed by: INTERNAL MEDICINE

## 2025-05-02 PROCEDURE — 2500000001 HC RX 250 WO HCPCS SELF ADMINISTERED DRUGS (ALT 637 FOR MEDICARE OP): Performed by: SURGERY

## 2025-05-02 PROCEDURE — 72125 CT NECK SPINE W/O DYE: CPT

## 2025-05-02 PROCEDURE — 93308 TTE F-UP OR LMTD: CPT | Performed by: EMERGENCY MEDICINE

## 2025-05-02 PROCEDURE — 85520 HEPARIN ASSAY: CPT | Performed by: SURGERY

## 2025-05-02 PROCEDURE — 94660 CPAP INITIATION&MGMT: CPT

## 2025-05-02 PROCEDURE — 80069 RENAL FUNCTION PANEL: CPT | Mod: CCI | Performed by: EMERGENCY MEDICINE

## 2025-05-02 PROCEDURE — 72170 X-RAY EXAM OF PELVIS: CPT

## 2025-05-02 PROCEDURE — 82947 ASSAY GLUCOSE BLOOD QUANT: CPT

## 2025-05-02 PROCEDURE — 71275 CT ANGIOGRAPHY CHEST: CPT

## 2025-05-02 PROCEDURE — 87641 MR-STAPH DNA AMP PROBE: CPT | Performed by: EMERGENCY MEDICINE

## 2025-05-02 PROCEDURE — 85027 COMPLETE CBC AUTOMATED: CPT | Performed by: EMERGENCY MEDICINE

## 2025-05-02 PROCEDURE — 70450 CT HEAD/BRAIN W/O DYE: CPT

## 2025-05-02 RX ORDER — DEXTROSE 50 % IN WATER (D50W) INTRAVENOUS SYRINGE
25
Status: DISCONTINUED | OUTPATIENT
Start: 2025-05-02 | End: 2025-05-03

## 2025-05-02 RX ORDER — MORPHINE SULFATE 2 MG/ML
2 INJECTION, SOLUTION INTRAMUSCULAR; INTRAVENOUS
Status: DISCONTINUED | OUTPATIENT
Start: 2025-05-02 | End: 2025-05-03

## 2025-05-02 RX ORDER — ASPIRIN 300 MG/1
600 SUPPOSITORY RECTAL ONCE
Status: COMPLETED | OUTPATIENT
Start: 2025-05-02 | End: 2025-05-02

## 2025-05-02 RX ORDER — ACETAMINOPHEN 650 MG/1
650 SUPPOSITORY RECTAL ONCE
Status: DISCONTINUED | OUTPATIENT
Start: 2025-05-02 | End: 2025-05-02

## 2025-05-02 RX ORDER — FENTANYL CITRATE 50 UG/ML
INJECTION, SOLUTION INTRAMUSCULAR; INTRAVENOUS
Status: COMPLETED
Start: 2025-05-02 | End: 2025-05-02

## 2025-05-02 RX ORDER — ASPIRIN 81 MG/1
81 TABLET ORAL DAILY
Status: DISCONTINUED | OUTPATIENT
Start: 2025-05-02 | End: 2025-05-03

## 2025-05-02 RX ORDER — MAGNESIUM SULFATE HEPTAHYDRATE 40 MG/ML
2 INJECTION, SOLUTION INTRAVENOUS ONCE
Status: COMPLETED | OUTPATIENT
Start: 2025-05-02 | End: 2025-05-03

## 2025-05-02 RX ORDER — CALCITRIOL 0.25 UG/1
0.25 CAPSULE ORAL EVERY OTHER DAY
Status: DISCONTINUED | OUTPATIENT
Start: 2025-05-03 | End: 2025-05-03

## 2025-05-02 RX ORDER — ACETAMINOPHEN 325 MG/1
650 TABLET ORAL 3 TIMES DAILY
Status: DISCONTINUED | OUTPATIENT
Start: 2025-05-02 | End: 2025-05-03

## 2025-05-02 RX ORDER — NOREPINEPHRINE BITARTRATE/D5W 8 MG/250ML
0-.5 PLASTIC BAG, INJECTION (ML) INTRAVENOUS CONTINUOUS
Status: DISCONTINUED | OUTPATIENT
Start: 2025-05-02 | End: 2025-05-03

## 2025-05-02 RX ORDER — VANCOMYCIN HYDROCHLORIDE 1 G/20ML
INJECTION, POWDER, LYOPHILIZED, FOR SOLUTION INTRAVENOUS DAILY PRN
Status: DISCONTINUED | OUTPATIENT
Start: 2025-05-02 | End: 2025-05-02

## 2025-05-02 RX ORDER — SODIUM CHLORIDE FOR INHALATION 3 %
3 VIAL, NEBULIZER (ML) INHALATION
Status: DISCONTINUED | OUTPATIENT
Start: 2025-05-02 | End: 2025-05-03

## 2025-05-02 RX ORDER — DEXTROSE 50 % IN WATER (D50W) INTRAVENOUS SYRINGE
12.5
Status: DISCONTINUED | OUTPATIENT
Start: 2025-05-02 | End: 2025-05-03

## 2025-05-02 RX ORDER — HEPARIN SODIUM 10000 [USP'U]/100ML
0-4000 INJECTION, SOLUTION INTRAVENOUS CONTINUOUS
Status: DISCONTINUED | OUTPATIENT
Start: 2025-05-02 | End: 2025-05-03

## 2025-05-02 RX ORDER — CEFTRIAXONE 2 G/50ML
2 INJECTION, SOLUTION INTRAVENOUS ONCE
Status: DISCONTINUED | OUTPATIENT
Start: 2025-05-02 | End: 2025-05-02

## 2025-05-02 RX ORDER — FENTANYL CITRATE 50 UG/ML
25 INJECTION, SOLUTION INTRAMUSCULAR; INTRAVENOUS ONCE
Status: COMPLETED | OUTPATIENT
Start: 2025-05-03 | End: 2025-05-02

## 2025-05-02 RX ORDER — ACETAMINOPHEN 325 MG/1
TABLET ORAL
Status: COMPLETED
Start: 2025-05-02 | End: 2025-05-02

## 2025-05-02 RX ORDER — INSULIN LISPRO 100 [IU]/ML
0-10 INJECTION, SOLUTION INTRAVENOUS; SUBCUTANEOUS EVERY 4 HOURS
Status: DISCONTINUED | OUTPATIENT
Start: 2025-05-02 | End: 2025-05-03

## 2025-05-02 RX ORDER — AZITHROMYCIN 500 MG/1
250 TABLET, FILM COATED ORAL DAILY
Status: DISCONTINUED | OUTPATIENT
Start: 2025-05-03 | End: 2025-05-03

## 2025-05-02 RX ORDER — HALOPERIDOL LACTATE 5 MG/ML
1 INJECTION, SOLUTION INTRAMUSCULAR ONCE
Status: COMPLETED | OUTPATIENT
Start: 2025-05-02 | End: 2025-05-02

## 2025-05-02 RX ORDER — DEXMEDETOMIDINE HYDROCHLORIDE 4 UG/ML
0-1.5 INJECTION, SOLUTION INTRAVENOUS CONTINUOUS
Status: DISCONTINUED | OUTPATIENT
Start: 2025-05-02 | End: 2025-05-03

## 2025-05-02 RX ORDER — HYDROMORPHONE HYDROCHLORIDE 0.2 MG/ML
0.2 INJECTION INTRAMUSCULAR; INTRAVENOUS; SUBCUTANEOUS ONCE
Status: COMPLETED | OUTPATIENT
Start: 2025-05-02 | End: 2025-05-02

## 2025-05-02 RX ORDER — ALBUTEROL SULFATE 0.83 MG/ML
3 SOLUTION RESPIRATORY (INHALATION)
Status: DISCONTINUED | OUTPATIENT
Start: 2025-05-02 | End: 2025-05-03

## 2025-05-02 RX ORDER — ACETAMINOPHEN 325 MG/1
650 TABLET ORAL ONCE
Status: COMPLETED | OUTPATIENT
Start: 2025-05-02 | End: 2025-05-02

## 2025-05-02 RX ORDER — GUAIFENESIN/DEXTROMETHORPHAN 100-10MG/5
5 SYRUP ORAL EVERY 4 HOURS
Status: DISCONTINUED | OUTPATIENT
Start: 2025-05-02 | End: 2025-05-03

## 2025-05-02 RX ADMIN — SODIUM CHLORIDE, POTASSIUM CHLORIDE, SODIUM LACTATE AND CALCIUM CHLORIDE 500 ML: 600; 310; 30; 20 INJECTION, SOLUTION INTRAVENOUS at 18:56

## 2025-05-02 RX ADMIN — ASPIRIN 81 MG: 81 TABLET, COATED ORAL at 18:11

## 2025-05-02 RX ADMIN — Medication 60 L/MIN: at 18:22

## 2025-05-02 RX ADMIN — FENTANYL CITRATE 25 MCG: 50 INJECTION, SOLUTION INTRAMUSCULAR; INTRAVENOUS at 22:15

## 2025-05-02 RX ADMIN — NOREPINEPHRINE BITARTRATE 0.01 MCG/KG/MIN: 8 INJECTION, SOLUTION INTRAVENOUS at 21:30

## 2025-05-02 RX ADMIN — ALBUTEROL SULFATE 3 ML: 2.5 SOLUTION RESPIRATORY (INHALATION) at 18:22

## 2025-05-02 RX ADMIN — PIPERACILLIN SODIUM AND TAZOBACTAM SODIUM 4.5 G: 4; .5 INJECTION, SOLUTION INTRAVENOUS at 11:43

## 2025-05-02 RX ADMIN — HYDROMORPHONE HYDROCHLORIDE 0.2 MG: 0.2 INJECTION, SOLUTION INTRAMUSCULAR; INTRAVENOUS; SUBCUTANEOUS at 13:02

## 2025-05-02 RX ADMIN — ASPIRIN 600 MG: 300 SUPPOSITORY RECTAL at 15:12

## 2025-05-02 RX ADMIN — DEXMEDETOMIDINE HYDROCHLORIDE 0.2 MCG/KG/HR: 4 INJECTION, SOLUTION INTRAVENOUS at 20:06

## 2025-05-02 RX ADMIN — PIPERACILLIN SODIUM AND TAZOBACTAM SODIUM 3.38 G: 3; .375 INJECTION, SOLUTION INTRAVENOUS at 18:23

## 2025-05-02 RX ADMIN — HEPARIN SODIUM 960 UNITS/HR: 10000 INJECTION, SOLUTION INTRAVENOUS at 18:11

## 2025-05-02 RX ADMIN — VANCOMYCIN HYDROCHLORIDE 1500 MG: 5 INJECTION, POWDER, LYOPHILIZED, FOR SOLUTION INTRAVENOUS at 14:01

## 2025-05-02 RX ADMIN — ACETAMINOPHEN 650 MG: 325 TABLET ORAL at 11:44

## 2025-05-02 RX ADMIN — HALOPERIDOL LACTATE 1 MG: 5 INJECTION, SOLUTION INTRAMUSCULAR at 15:12

## 2025-05-02 RX ADMIN — MORPHINE SULFATE 2 MG: 2 INJECTION, SOLUTION INTRAMUSCULAR; INTRAVENOUS at 18:17

## 2025-05-02 RX ADMIN — MAGNESIUM SULFATE HEPTAHYDRATE 2 G: 40 INJECTION, SOLUTION INTRAVENOUS at 22:19

## 2025-05-02 RX ADMIN — SODIUM CHLORIDE SOLN NEBU 3% 3 ML: 3 NEBU SOLN at 18:32

## 2025-05-02 RX ADMIN — AZITHROMYCIN MONOHYDRATE 500 MG: 500 INJECTION, POWDER, LYOPHILIZED, FOR SOLUTION INTRAVENOUS at 11:39

## 2025-05-02 RX ADMIN — Medication 60 PERCENT: at 18:40

## 2025-05-02 RX ADMIN — SODIUM CHLORIDE, SODIUM LACTATE, POTASSIUM CHLORIDE, AND CALCIUM CHLORIDE 1000 ML: .6; .31; .03; .02 INJECTION, SOLUTION INTRAVENOUS at 11:40

## 2025-05-02 RX ADMIN — SODIUM CHLORIDE, SODIUM LACTATE, POTASSIUM CHLORIDE, AND CALCIUM CHLORIDE 500 ML: .6; .31; .03; .02 INJECTION, SOLUTION INTRAVENOUS at 20:29

## 2025-05-02 RX ADMIN — Medication 70 PERCENT: at 23:05

## 2025-05-02 RX ADMIN — SODIUM CHLORIDE, SODIUM LACTATE, POTASSIUM CHLORIDE, AND CALCIUM CHLORIDE 500 ML: .6; .31; .03; .02 INJECTION, SOLUTION INTRAVENOUS at 15:59

## 2025-05-02 RX ADMIN — FENTANYL CITRATE 25 MCG: 0.05 INJECTION, SOLUTION INTRAMUSCULAR; INTRAVENOUS at 22:15

## 2025-05-02 RX ADMIN — IOHEXOL 80 ML: 350 INJECTION, SOLUTION INTRAVENOUS at 16:56

## 2025-05-02 ASSESSMENT — ENCOUNTER SYMPTOMS
APPETITE CHANGE: 1
BACK PAIN: 1
PSYCHIATRIC NEGATIVE: 1
HEMATOLOGIC/LYMPHATIC NEGATIVE: 1
EYES NEGATIVE: 1
ALLERGIC/IMMUNOLOGIC NEGATIVE: 1
NEUROLOGICAL NEGATIVE: 1
GASTROINTESTINAL NEGATIVE: 1
ACTIVITY CHANGE: 1
COUGH: 1
FATIGUE: 1
ENDOCRINE NEGATIVE: 1
SHORTNESS OF BREATH: 1

## 2025-05-02 ASSESSMENT — COGNITIVE AND FUNCTIONAL STATUS - GENERAL
HELP NEEDED FOR BATHING: A LITTLE
DRESSING REGULAR UPPER BODY CLOTHING: A LITTLE
PERSONAL GROOMING: A LITTLE
DAILY ACTIVITIY SCORE: 19
WALKING IN HOSPITAL ROOM: A LOT
WALKING IN HOSPITAL ROOM: A LOT
STANDING UP FROM CHAIR USING ARMS: A LOT
PERSONAL GROOMING: A LITTLE
DRESSING REGULAR UPPER BODY CLOTHING: A LITTLE
MOVING FROM LYING ON BACK TO SITTING ON SIDE OF FLAT BED WITH BEDRAILS: A LITTLE
DAILY ACTIVITIY SCORE: 19
PATIENT BASELINE BEDBOUND: NO
TURNING FROM BACK TO SIDE WHILE IN FLAT BAD: A LITTLE
TURNING FROM BACK TO SIDE WHILE IN FLAT BAD: A LITTLE
MOVING FROM LYING ON BACK TO SITTING ON SIDE OF FLAT BED WITH BEDRAILS: A LITTLE
DRESSING REGULAR LOWER BODY CLOTHING: A LITTLE
MOBILITY SCORE: 15
STANDING UP FROM CHAIR USING ARMS: A LOT
DRESSING REGULAR LOWER BODY CLOTHING: A LITTLE
TOILETING: A LITTLE
MOVING TO AND FROM BED TO CHAIR: A LITTLE
CLIMB 3 TO 5 STEPS WITH RAILING: A LOT
TOILETING: A LITTLE
MOBILITY SCORE: 15
MOVING TO AND FROM BED TO CHAIR: A LITTLE
CLIMB 3 TO 5 STEPS WITH RAILING: A LOT
HELP NEEDED FOR BATHING: A LITTLE

## 2025-05-02 ASSESSMENT — COLUMBIA-SUICIDE SEVERITY RATING SCALE - C-SSRS
6. HAVE YOU EVER DONE ANYTHING, STARTED TO DO ANYTHING, OR PREPARED TO DO ANYTHING TO END YOUR LIFE?: NO
1. IN THE PAST MONTH, HAVE YOU WISHED YOU WERE DEAD OR WISHED YOU COULD GO TO SLEEP AND NOT WAKE UP?: NO
2. HAVE YOU ACTUALLY HAD ANY THOUGHTS OF KILLING YOURSELF?: NO

## 2025-05-02 ASSESSMENT — PAIN - FUNCTIONAL ASSESSMENT
PAIN_FUNCTIONAL_ASSESSMENT: 0-10

## 2025-05-02 ASSESSMENT — ACTIVITIES OF DAILY LIVING (ADL)
HEARING - LEFT EAR: FUNCTIONAL
PATIENT'S MEMORY ADEQUATE TO SAFELY COMPLETE DAILY ACTIVITIES?: YES
HEARING - RIGHT EAR: FUNCTIONAL
ASSISTIVE_DEVICE: WALKER
JUDGMENT_ADEQUATE_SAFELY_COMPLETE_DAILY_ACTIVITIES: YES
ADEQUATE_TO_COMPLETE_ADL: YES

## 2025-05-02 ASSESSMENT — PAIN SCALES - GENERAL
PAINLEVEL_OUTOF10: 9
PAINLEVEL_OUTOF10: 9
PAINLEVEL_OUTOF10: 5 - MODERATE PAIN

## 2025-05-02 NOTE — ED PROVIDER NOTES
HPI   Chief Complaint   Patient presents with    Fall       89-year-old male with history of CAD, hypertension, lumbar spinal stenosis, CKD who is presenting after reported fall while doing his physical therapy.  Lives home alone.  Complaining of cough.  On arrival is significantly altered and unable to provide significant history aside from mentioning his cough.  He is unable to answer yes or no questions to review of systems.    Per family, he was supposed to meet his son on Wednesday and never showed up.  Prior to today is alert and oriented x 4 at baseline.  The family members at the bedside last saw him 1 week ago when he was at his baseline.      History provided by:  EMS personnel and relative  History limited by:  Mental status change   used: No            Patient History   Medical History[1]  Surgical History[2]  Family History[3]  Social History[4]    Physical Exam   ED Triage Vitals [05/02/25 1112]   Temperature Heart Rate Respirations BP   (!) 38 °C (100.4 °F) (!) 105 20 (!) 140/112      Pulse Ox Temp Source Heart Rate Source Patient Position   (!) 92 % Temporal Monitor --      BP Location FiO2 (%)     -- --       Physical Exam  Vitals and nursing note reviewed.   Constitutional:       General: He is in acute distress.      Appearance: He is ill-appearing.   HENT:      Head: Normocephalic and atraumatic.      Nose: Nose normal.      Mouth/Throat:      Mouth: Mucous membranes are moist.   Eyes:      Extraocular Movements: Extraocular movements intact.      Pupils: Pupils are equal, round, and reactive to light.   Neck:      Comments: Unable to assess cervical spine tenderness due to altered mental status  Cardiovascular:      Rate and Rhythm: Tachycardia present.      Pulses: Normal pulses.   Pulmonary:      Effort: Respiratory distress present.      Breath sounds: Rhonchi present. No wheezing or rales.      Comments: Deep gurgling breathing  Abdominal:      General: Abdomen is flat.       Tenderness: There is no abdominal tenderness.      Hernia: No hernia is present.   Genitourinary:     Penis: Normal.       Testes: Normal.   Musculoskeletal:      Cervical back: Normal range of motion.      Right lower leg: No edema.      Left lower leg: No edema.      Comments: Bruising over right knee, right forearm unclear if new or old.  He has no deformity to the joints in the arms or the legs, has full range of motion of the legs and the arms without any indication that he is in pain, he is moving all those extremities independently   Skin:     General: Skin is warm.      Capillary Refill: Capillary refill takes less than 2 seconds.      Findings: Bruising present.   Neurological:      Mental Status: He is lethargic and disoriented.      Sensory: Sensation is intact.      Comments: Localizes to pain in all 4 extremities, moving all extremities equally         ED Course & MDM   ED Course as of 05/04/25 0941   Fri May 02, 2025   1405 HiFlo 70% 30L [AM]   1631 Max hi joel 100% 60L [AM]      ED Course User Index  [AM] Jacob Forde MD         Diagnoses as of 05/04/25 0941   Hypoxia   Fall, initial encounter   Altered mental status, unspecified altered mental status type   NSTEMI (non-ST elevated myocardial infarction) (Multi)   Sepsis with acute hypoxic respiratory failure without septic shock, due to unspecified organism                 No data recorded                                 Medical Decision Making  89-year-old male who is presenting with altered mental status, tachypnea, hypoxia.  Has a fever, concern for sepsis.  Chest x-ray shows pneumonia, will treat with broad-spectrum antibiotics.  EKG is sinus rhythm with a heart rate of 113, has a right bundle branch block that appears consistent per prior chart documentation, no new ST segment elevations or depressions, no T wave inversions.  Will plan to perez scan given the fall and altered mental status.  I do not identify any areas of deformity or  tenderness on his physical exam of the extremities other than the right forearm where he has a significant bruise.    CT shows no acute traumatic bleed or fracture, has an elevated lactate with concern for sepsis.  Troponin is uptrending to 3900 with newly elevated BNP.  I did a bedside echocardiogram that did not show a significantly reduced EF, IVC view unable to be obtained due to patient habitus and agitation (images in PACS).  Differential for elevated troponin includes demand ischemia from hypoxia and sepsis, potential septic cardiomyopathy, less likely type I NSTEMI given there are no significant EKG changes, potential PE.  Given the elevated BNP, his tenuous respiratory status, and DNI status, I deferred the full 30 cc/kg fluid bolus and initially gave 1 L of fluids.  Upon reassessment, he continues to be tachycardic, has moist mucous membranes and capillary refill less than 2 seconds.    He was initiated on high flow nasal cannula and needed to be transition to the high settings of 100% oxygen and 60 L.  He was stable on those settings.  He remains agitated and very altered.  He was admitted to the intensive care unit for further monitoring and treatment of his hypoxic status likely secondary to pneumonia.  Given the elevated troponin and BNP, I did send him for CT pulmonary embolism that was still pending upon transfer to the ICU.    DNR/DNI per HCP and daughter Michelle Carpenter         Procedure    Performed by: Jacob Forde MD  Authorized by: Jacob Forde MD                  Respiratory Indications: hypoxia    Procedure: Cardiac Ultrasound    Findings:   Views: parasternal long, parasternal short and apical four  The pericardial space was visualized and was NEGATIVE for a significant pericardial effusion.  Activity: Ventricular contractions were visualized.  LV: does not appear significantly reduced on PSL and PSS, potentially reduced on apical 4  RV: RV size was NORMAL.    Impression:  Cardiac: The  focused cardiac ultrasound exam was NORMAL.      Comments: Challenging views due to body habitus, subxiphoid unable to be obtained. Reduced inferior wall motion on PSS.                 Jacob Forde MD  05/04/25 1003         [1]   Past Medical History:  Diagnosis Date    Class 1 obesity without serious comorbidity with body mass index (BMI) of 30.0 to 30.9 in adult 03/08/2024    Elevated prostate specific antigen (PSA) 02/23/2021    High prostate specific antigen (PSA)    Encounter for general adult medical examination without abnormal findings 09/29/2021    Medicare annual wellness visit, subsequent    Other conditions influencing health status 05/26/2021    Diabetes mellitus type 2, uncontrolled    Other specified symptoms and signs involving the circulatory and respiratory systems     Abdominal bruit    Personal history of other diseases of the nervous system and sense organs 12/01/2017    History of carpal tunnel syndrome    Personal history of other drug therapy 09/29/2021    History of influenza vaccination    Personal history of other endocrine, nutritional and metabolic disease     History of hyperkalemia    Personal history of other malignant neoplasm of skin     History of malignant neoplasm of skin   [2]   Past Surgical History:  Procedure Laterality Date    CARPAL TUNNEL RELEASE Bilateral 01/26/2017    Neuroplasty Decompression Median Nerve At Carpal Tunnel    CATARACT EXTRACTION  03/07/2017    Cataract Surgery    COLONOSCOPY  02/27/2019    Complete Colonoscopy   [3]   Family History  Problem Relation Name Age of Onset    Arthritis Mother      Other (CARDIAC DISORDER) Mother      Diabetes Mother      Hypertension Mother      Diabetes Father      Diabetes Sister      Diabetes Brother      Diabetes Sibling     [4]   Social History  Tobacco Use    Smoking status: Former     Current packs/day: 0.00     Average packs/day: 1 pack/day for 30.0 years (30.0 ttl pk-yrs)     Types: Cigarettes     Start date: 1963      Quit date:      Years since quittin.3    Smokeless tobacco: Never   Vaping Use    Vaping status: Never Used   Substance Use Topics    Alcohol use: Not Currently     Comment: special occasions    Drug use: Never        Jacob Forde MD  25 1002

## 2025-05-02 NOTE — PROGRESS NOTES
Pharmacy Medication History     Source of Information: Patient daughter and son in-law over the phone.    Additional concerns with the patient's PTA list.   N/A  Notified Provider via [employer]iku : No    The following updates were made to the Prior to Admission medication list:     Medications ADDED:   N/A  Medications CHANGED:  N/A  Medications REMOVED:   N/A  Medications NOT TAKING:   N/A    Allergy reviewed : Yes    Meds 2 Beds : Yes    Outpatient pharmacy confirmed and updated in chart : Yes    Pharmacy name: [facility] - [place]. [address]    The list below reflectives the updated PTA list. Please review each medication in order reconciliation for additional clarification and justification.    Prior to Admission Medications   Prescriptions Last Dose   acetaminophen (Tylenol) 500 mg capsule 2025   Sig: Take 2 capsules (1,000 mg) by mouth every 6 hours if needed.   amLODIPine (Norvasc) 5 mg tablet 2025   Sig: Take 1 tablet (5 mg) by mouth once daily.   aspirin 81 mg EC tablet 2025 Morning   Sig: Take 1 tablet (81 mg) by mouth once daily.   blood sugar diagnostic (Accu-Chek Guide test strips) Unknown   Sig: TEST ONCE DAILY   calcitriol (Rocaltrol) 0.25 mcg capsule 2025   Sig: Take 1 capsule (0.25 mcg) by mouth every other day.   cholecalciferol (Vitamin D-3) 25 MCG (1000 UT) capsule 2025 Evening   Sig: Take by mouth.   glimepiride (AmaryL) 1 mg tablet 2025   Si.5 mg daily   lancets (Accu-Chek Fastclix Lancet Drum) mis Unknown   Si each once daily.   simvastatin (Zocor) 40 mg tablet 2025   Sig: TAKE ONE TABLET BY MOUTH EVERY DAY   terazosin (Hytrin) 5 mg capsule 2025   Sig: TAKE ONE CAPSULE BY MOUTH EVERY DAY      Facility-Administered Medications: None       The list below reflectives the updated allergy list. Please review each documented allergy for additional clarification and justification.    No Known Allergies       25 at 1:53 PM - Christina Lantigua

## 2025-05-02 NOTE — PROGRESS NOTES
Patient has been identified as having an emergent need for administration of iodinated contrast for CT scan prior to result of laboratory studies OR despite known elevated GFR due to possibility of life and/or limb threatening pathology.     I acknowledge the risks and benefits of emergently proceeding with contrast administration including that, at present, it is the position of the [employer] that contrast induced nephropathy (BEST) is a rare but possible consequence. At this time the benefits of proceeding with contrast administration outweigh the risks.     Attempts will be made to mitigate possible BEST risk with IV fluid hydration if able.

## 2025-05-02 NOTE — H&P
History Of Present Illness  Nik Washington is a 89 y.o. male presenting with hypoxia, pneumonia and troponin leak.     Past Medical History  Medical History[1]    Surgical History  Surgical History[2]     Social History  He reports that he quit smoking about 32 years ago. His smoking use included cigarettes. He started smoking about 62 years ago. He has a 30 pack-year smoking history. He has never used smokeless tobacco. He reports that he does not currently use alcohol. He reports that he does not use drugs.    Family History  Family History[3]     Allergies  Patient has no known allergies.    Review of Systems   Constitutional:  Positive for activity change, appetite change and fatigue.   HENT: Negative.     Eyes: Negative.    Respiratory:  Positive for cough and shortness of breath.    Gastrointestinal: Negative.    Endocrine: Negative.    Genitourinary: Negative.    Musculoskeletal:  Positive for back pain (chronic).   Skin: Negative.    Allergic/Immunologic: Negative.    Neurological: Negative.    Hematological: Negative.    Psychiatric/Behavioral: Negative.          Physical Exam  Constitutional:       General: He is in acute distress.      Appearance: He is ill-appearing.   HENT:      Mouth/Throat:      Mouth: Mucous membranes are dry.   Eyes:      Pupils: Pupils are equal, round, and reactive to light.   Cardiovascular:      Rate and Rhythm: Regular rhythm. Tachycardia present.   Pulmonary:      Breath sounds: Rhonchi and rales present.   Abdominal:      General: There is no distension.      Palpations: Abdomen is soft.      Tenderness: There is no abdominal tenderness.   Musculoskeletal:         General: No swelling or tenderness.   Skin:     General: Skin is warm and dry.      Findings: Bruising (right arm and right flank) present.   Neurological:      Mental Status: He is alert and oriented to person, place, and time.   Psychiatric:         Mood and Affect: Mood normal.         Thought Content:  "Thought content normal.          Last Recorded Vitals  Blood pressure 132/71, pulse (!) 112, temperature 36.8 °C (98.2 °F), temperature source Tympanic, resp. rate (!) 35, height 1.622 m (5' 3.86\"), weight 80.3 kg (177 lb 0.5 oz), SpO2 91%.    Relevant Results  Scheduled medications  Scheduled Medications[4]  Continuous medications  Continuous Medications[5]  PRN medications  PRN Medications[6]    Results for orders placed or performed during the hospital encounter of 05/02/25 (from the past 24 hours)   CBC and Auto Differential   Result Value Ref Range    WBC 15.6 (H) 4.4 - 11.3 x10*3/uL    nRBC 0.0 0.0 - 0.0 /100 WBCs    RBC 3.77 (L) 4.50 - 5.90 x10*6/uL    Hemoglobin 11.5 (L) 13.5 - 17.5 g/dL    Hematocrit 34.9 (L) 41.0 - 52.0 %    MCV 93 80 - 100 fL    MCH 30.5 26.0 - 34.0 pg    MCHC 33.0 32.0 - 36.0 g/dL    RDW 13.2 11.5 - 14.5 %    Platelets 148 (L) 150 - 450 x10*3/uL    Immature Granulocytes %, Automated 1.0 (H) 0.0 - 0.9 %    Immature Granulocytes Absolute, Automated 0.16 0.00 - 0.50 x10*3/uL   Comprehensive metabolic panel   Result Value Ref Range    Glucose 162 (H) 74 - 99 mg/dL    Sodium 137 136 - 145 mmol/L    Potassium 4.0 3.5 - 5.3 mmol/L    Chloride 104 98 - 107 mmol/L    Bicarbonate 18 (L) 21 - 32 mmol/L    Anion Gap 19 10 - 20 mmol/L    Urea Nitrogen 40 (H) 6 - 23 mg/dL    Creatinine 2.25 (H) 0.50 - 1.30 mg/dL    eGFR 27 (L) >60 mL/min/1.73m*2    Calcium 8.9 8.6 - 10.3 mg/dL    Albumin 4.0 3.4 - 5.0 g/dL    Alkaline Phosphatase 21 (L) 33 - 136 U/L    Total Protein 6.2 (L) 6.4 - 8.2 g/dL     (H) 9 - 39 U/L    Bilirubin, Total 1.0 0.0 - 1.2 mg/dL    ALT 76 (H) 10 - 52 U/L   Troponin I, High Sensitivity, Initial   Result Value Ref Range    Troponin I, High Sensitivity 1,970 (HH) 0 - 20 ng/L   Blood Culture    Specimen: Peripheral Venipuncture; Blood culture   Result Value Ref Range    Blood Culture Loaded on Instrument - Culture in progress    Blood Gas Venous Full Panel   Result Value Ref Range "    POCT pH, Venous 7.37 7.33 - 7.43 pH    POCT pCO2, Venous 38 (L) 41 - 51 mm Hg    POCT pO2, Venous 27 (L) 35 - 45 mm Hg    POCT SO2, Venous 42 (L) 45 - 75 %    POCT Oxy Hemoglobin, Venous 41.2 (L) 45.0 - 75.0 %    POCT Hematocrit Calculated, Venous 36.0 (L) 41.0 - 52.0 %    POCT Sodium, Venous 135 (L) 136 - 145 mmol/L    POCT Potassium, Venous 4.1 3.5 - 5.3 mmol/L    POCT Chloride, Venous 103 98 - 107 mmol/L    POCT Ionized [place], Venous 1.15 1.10 - 1.33 mmol/L    POCT Glucose, Venous 179 (H) 74 - 99 mg/dL    POCT Lactate, Venous 5.1 (HH) 0.4 - 2.0 mmol/L    POCT Base Excess, Venous -3.0 (L) -2.0 - 3.0 mmol/L    POCT HCO3 Calculated, Venous 22.0 22.0 - 26.0 mmol/L    POCT Hemoglobin, Venous 12.0 (L) 13.5 - 17.5 g/dL    POCT Anion Gap, Venous 14.0 10.0 - 25.0 mmol/L    Patient Temperature 37.0 degrees Celsius    FiO2 21 %   Lactate   Result Value Ref Range    Lactate 4.6 (HH) 0.4 - 2.0 mmol/L   Manual Differential   Result Value Ref Range    Neutrophils %, Manual 81.0 40.0 - 80.0 %    Bands %, Manual 11.0 0.0 - 5.0 %    Lymphocytes %, Manual 4.0 13.0 - 44.0 %    Monocytes %, Manual 1.0 2.0 - 10.0 %    Eosinophils %, Manual 0.0 0.0 - 6.0 %    Basophils %, Manual 0.0 0.0 - 2.0 %    Metamyelocytes %, Manual 2.0 0.0 - 0.0 %    Myelocytes %, Manual 1.0 0.0 - 0.0 %    Seg Neutrophils Absolute, Manual 12.64 (H) 1.60 - 5.00 x10*3/uL    Bands Absolute, Manual 1.72 (H) 0.00 - 0.50 x10*3/uL    Lymphocytes Absolute, Manual 0.62 (L) 0.80 - 3.00 x10*3/uL    Monocytes Absolute, Manual 0.16 0.05 - 0.80 x10*3/uL    Eosinophils Absolute, Manual 0.00 0.00 - 0.40 x10*3/uL    Basophils Absolute, Manual 0.00 0.00 - 0.10 x10*3/uL    Metamyelocytes Absolute, Manual 0.31 0.00 - 0.00 x10*3/uL    Myelocytes Absolute, Manual 0.16 0.00 - 0.00 x10*3/uL    Total Cells Counted 100     Neutrophils Absolute, Manual 14.36 (H) 1.60 - 5.50 x10*3/uL    RBC Morphology See Below     [place] Cells Few    Acute Toxicology Panel, Blood   Result Value Ref  Range    Acetaminophen <10.0 10.0 - 30.0 ug/mL    Salicylate  <3 4 - 20 mg/dL    Alcohol <10 <=10 mg/dL   Lipase   Result Value Ref Range    Lipase 15 9 - 82 U/L   B-type natriuretic peptide   Result Value Ref Range     (H) 0 - 99 pg/mL   Sars-CoV-2 and Influenza A/B PCR   Result Value Ref Range    Flu A Result Not Detected Not Detected    Flu B Result Not Detected Not Detected    Coronavirus 2019, PCR Not Detected Not Detected   ECG 12 lead   Result Value Ref Range    Ventricular Rate 113 BPM    Atrial Rate 113 BPM    N[place] Interval 186 ms    QRS Duration 126 ms    QT Interval 322 ms    QTC Calculation(Bazett) 441 ms    P Axis -16 degrees    R Axis 24 degrees    T Axis -11 degrees    QRS Count 18 beats    Q Onset 229 ms    P Onset 136 ms    P Offset 178 ms    T Offset 390 ms    QTC Fredericia 397 ms   APTT   Result Value Ref Range    aPTT 31 26 - 36 seconds   Protime-INR   Result Value Ref Range    Protime 12.5 (H) 9.8 - 12.4 seconds    INR 1.1 0.9 - 1.1   Troponin, High Sensitivity, 1 Hour   Result Value Ref Range    Troponin I, High Sensitivity 3,981 (HH) 0 - 20 ng/L   ECG 12 lead   Result Value Ref Range    Ventricular Rate 111 BPM    Atrial Rate 111 BPM    NJ Interval 164 ms    QRS Duration 112 ms    QT Interval 320 ms    QTC Calculation(Bazett) 435 ms    R Axis 58 degrees    T Axis -1 degrees    QRS Count 19 beats    Q Onset 230 ms    P Onset 148 ms    P Offset 178 ms    T Offset 390 ms    QTC Fredericia 393 ms   Lactate   Result Value Ref Range    Lactate 5.2 (HH) 0.4 - 2.0 mmol/L   MRSA Surveillance for Vancomycin De-escalation, PCR    Specimen: Anterior Nares; Swab   Result Value Ref Range    MRSA PCR Not Detected Not Detected      Assessment & Plan  Hypoxia      89yo male admitted with 2 weeks of progressive shortness of breath and weakness presenting after a fall at home during physical therapy. Found to have bilateral pneumonia, mild JAG on CKD, elevated LFTs and troponin bump  1900->3900.    Plan:    Neuro/Pain  Reportedly patient was disoriented on arrival to ER but is now alert and appropriate  -cont to monitor neuro status  -no hx of dementia  -tylenol TID for back pain    CV  Hx only of HTN, on admission pt with significant troponin leak 1900->4000. EKG without ST changes.  -pt on asa and statin at home  -will place on heparin gtt x48hrs  -cards consult, ECHO ordered  -holding statin due to elevated LFTs  -monitor anti Xa levels  -repeat troponin this evening, trend if escalating    PULM  Admitted with severe hypoxia, bilateral pneumonia  -currently on AirVo 60L 90%, saturation 95%  -robitussin DM and nebs/3% saline to assist with secretion clearance  -mild edema noted on CT, if BP tolerates may diurese in next 24 hrs  -patient is DNI, can place on BiPap if needed    GI  No hx of dysphagia or GERD  -bedside swallow eval pending (ER nurse noted some issues with swallowing on arrival)  -ok for clears if passes swallow    RENAL  Hx of CKD, mild JAG on admission  -pt notes poor PO intake the past several days    ID  Bilateral pneumonia on admission, severe hypoxia  -azithromycin  -vanc/zosyn     MSK  Pt admitted after fall at home  -bruising on right arm, right flank  -no LOC per pt  -has been working with PT at home --will reorder once more stable        I spent 60 minutes in the professional and overall care of this patient.    This critically ill patient continues to be at-risk for clinically significant deterioration / failure due to the above mentioned dysfunctional, unstable organ systems. I have personally identified and managed all complex critical care issues to prevent aforementioned clinical deterioration. Critical care time is spent at bedside and/or the immediate area and has included, but is not limited to, the review of diagnostic tests, labs, radiographs, serial assessments of hemodynamics, respiratory status, ventilatory management, and family updates. Time spent in  procedures and teaching are reported separately.     Samantha Johnson MD         [1]   Past Medical History:  Diagnosis Date    Class 1 obesity without serious comorbidity with body mass index (BMI) of 30.0 to 30.9 in adult 03/08/2024    Elevated prostate specific antigen (PSA) 02/23/2021    High prostate specific antigen (PSA)    Encounter for general adult medical examination without abnormal findings 09/29/2021    Medicare annual wellness visit, subsequent    Other conditions influencing health status 05/26/2021    Diabetes mellitus type 2, uncontrolled    Other specified symptoms and signs involving the circulatory and respiratory systems     Abdominal bruit    Personal history of other diseases of the nervous system and sense organs 12/01/2017    History of carpal tunnel syndrome    Personal history of other drug therapy 09/29/2021    History of influenza vaccination    Personal history of other endocrine, nutritional and metabolic disease     History of hyperkalemia    Personal history of other malignant neoplasm of skin     History of malignant neoplasm of skin   [2]   Past Surgical History:  Procedure Laterality Date    CARPAL TUNNEL RELEASE Bilateral 01/26/2017    Neuroplasty Decompression Median Nerve At Carpal Tunnel    CATARACT EXTRACTION  03/07/2017    Cataract Surgery    COLONOSCOPY  02/27/2019    Complete Colonoscopy   [3]   Family History  Problem Relation Name Age of Onset    Arthritis Mother      Other (CARDIAC DISORDER) Mother      Diabetes Mother      Hypertension Mother      Diabetes Father      Diabetes Sister      Diabetes Brother      Diabetes Sibling     [4] acetaminophen, 650 mg, oral, TID  albuterol, 3 mL, nebulization, 4x daily  aspirin, 81 mg, oral, Daily  [START ON 5/3/2025] calcitriol, 0.25 mcg, oral, Every other day  dextromethorphan-guaifenesin, 5 mL, oral, q4h  heparin, 4,000 Units, intravenous, Once  insulin lispro, 0-10 Units, subcutaneous, q4h  oxygen, , inhalation, Continuous -  Inhalation  sodium chloride, 3 mL, nebulization, 4x daily  [5] heparin, 0-4,000 Units/hr  [6] PRN medications: dextrose, dextrose, glucagon, glucagon, heparin, oxygen

## 2025-05-03 ENCOUNTER — APPOINTMENT (OUTPATIENT)
Dept: CARDIOLOGY | Facility: HOSPITAL | Age: OVER 89
DRG: 871 | End: 2025-05-03
Payer: MEDICARE

## 2025-05-03 LAB
ALBUMIN SERPL BCP-MCNC: 3.3 G/DL (ref 3.4–5)
ANION GAP BLDA CALCULATED.4IONS-SCNC: 22 MMO/L (ref 10–25)
ANION GAP SERPL CALC-SCNC: 25 MMOL/L (ref 10–20)
AORTIC VALVE MEAN GRADIENT: 2 MMHG
AORTIC VALVE PEAK VELOCITY: 1.01 M/S
AV PEAK GRADIENT: 4 MMHG
AVA (PEAK VEL): 1.7 CM2
AVA (VTI): 1.61 CM2
BASE EXCESS BLDA CALC-SCNC: -14.3 MMOL/L (ref -2–3)
BODY TEMPERATURE: 37 DEGREES CELSIUS
BUN SERPL-MCNC: 58 MG/DL (ref 6–23)
CA-I BLDA-SCNC: 0.98 MMOL/L (ref 1.1–1.33)
CALCIUM SERPL-MCNC: 8 MG/DL (ref 8.6–10.3)
CARDIAC TROPONIN I PNL SERPL HS: ABNORMAL NG/L (ref 0–20)
CHLORIDE BLDA-SCNC: 102 MMOL/L (ref 98–107)
CHLORIDE SERPL-SCNC: 101 MMOL/L (ref 98–107)
CO2 SERPL-SCNC: 11 MMOL/L (ref 21–32)
CREAT SERPL-MCNC: 3.51 MG/DL (ref 0.5–1.3)
EGFRCR SERPLBLD CKD-EPI 2021: 16 ML/MIN/1.73M*2
EJECTION FRACTION APICAL 4 CHAMBER: 28
EJECTION FRACTION: 25 %
ERYTHROCYTE [DISTWIDTH] IN BLOOD BY AUTOMATED COUNT: 13.3 % (ref 11.5–14.5)
GLUCOSE BLD MANUAL STRIP-MCNC: 156 MG/DL (ref 74–99)
GLUCOSE BLD MANUAL STRIP-MCNC: 198 MG/DL (ref 74–99)
GLUCOSE BLD MANUAL STRIP-MCNC: 202 MG/DL (ref 74–99)
GLUCOSE BLDA-MCNC: 161 MG/DL (ref 74–99)
GLUCOSE SERPL-MCNC: 165 MG/DL (ref 74–99)
HCO3 BLDA-SCNC: 10.8 MMOL/L (ref 22–26)
HCT VFR BLD AUTO: 29.2 % (ref 41–52)
HCT VFR BLD EST: 31 % (ref 41–52)
HGB BLD-MCNC: 10.1 G/DL (ref 13.5–17.5)
HGB BLDA-MCNC: 10.2 G/DL (ref 13.5–17.5)
INHALED O2 CONCENTRATION: 70 %
LACTATE BLDA-SCNC: 7.2 MMOL/L (ref 0.4–2)
LACTATE BLDV-SCNC: 9.7 MMOL/L (ref 0.4–2)
LEFT ATRIUM VOLUME AREA LENGTH INDEX BSA: 26.5 ML/M2
LEFT VENTRICLE INTERNAL DIMENSION DIASTOLE: 4.31 CM (ref 3.5–6)
LEFT VENTRICULAR OUTFLOW TRACT DIAMETER: 1.99 CM
LEGIONELLA AG UR QL: NEGATIVE
MAGNESIUM SERPL-MCNC: 2.19 MG/DL (ref 1.6–2.4)
MCH RBC QN AUTO: 31.9 PG (ref 26–34)
MCHC RBC AUTO-ENTMCNC: 34.6 G/DL (ref 32–36)
MCV RBC AUTO: 92 FL (ref 80–100)
MITRAL VALVE E/A RATIO: 0.84
NRBC BLD-RTO: 0 /100 WBCS (ref 0–0)
OXYHGB MFR BLDA: 98.3 % (ref 94–98)
PCO2 BLDA: 23 MM HG (ref 38–42)
PH BLDA: 7.28 PH (ref 7.38–7.42)
PHOSPHATE SERPL-MCNC: 7.8 MG/DL (ref 2.5–4.9)
PLATELET # BLD AUTO: 103 X10*3/UL (ref 150–450)
PO2 BLDA: 196 MM HG (ref 85–95)
POTASSIUM BLDA-SCNC: 6.5 MMOL/L (ref 3.5–5.3)
POTASSIUM SERPL-SCNC: 6.1 MMOL/L (ref 3.5–5.3)
POTASSIUM SERPL-SCNC: 6.3 MMOL/L (ref 3.5–5.3)
RBC # BLD AUTO: 3.17 X10*6/UL (ref 4.5–5.9)
RIGHT VENTRICLE PEAK SYSTOLIC PRESSURE: 20.7 MMHG
S PNEUM AG UR QL: NEGATIVE
SAO2 % BLDA: 100 % (ref 94–100)
SODIUM BLDA-SCNC: 128 MMOL/L (ref 136–145)
SODIUM SERPL-SCNC: 131 MMOL/L (ref 136–145)
TRICUSPID ANNULAR PLANE SYSTOLIC EXCURSION: 1 CM
UFH PPP CHRO-ACNC: 0.6 IU/ML (ref ?–1.1)
UFH PPP CHRO-ACNC: 0.6 IU/ML (ref ?–1.1)
WBC # BLD AUTO: 17.6 X10*3/UL (ref 4.4–11.3)

## 2025-05-03 PROCEDURE — 4A133J1 MONITORING OF ARTERIAL PULSE, PERIPHERAL, PERCUTANEOUS APPROACH: ICD-10-PCS | Performed by: SURGERY

## 2025-05-03 PROCEDURE — 82947 ASSAY GLUCOSE BLOOD QUANT: CPT

## 2025-05-03 PROCEDURE — 2020000001 HC ICU ROOM DAILY

## 2025-05-03 PROCEDURE — 85027 COMPLETE CBC AUTOMATED: CPT | Performed by: SURGERY

## 2025-05-03 PROCEDURE — 2500000002 HC RX 250 W HCPCS SELF ADMINISTERED DRUGS (ALT 637 FOR MEDICARE OP, ALT 636 FOR OP/ED): Performed by: STUDENT IN AN ORGANIZED HEALTH CARE EDUCATION/TRAINING PROGRAM

## 2025-05-03 PROCEDURE — 2500000004 HC RX 250 GENERAL PHARMACY W/ HCPCS (ALT 636 FOR OP/ED): Mod: JZ | Performed by: INTERNAL MEDICINE

## 2025-05-03 PROCEDURE — 82435 ASSAY OF BLOOD CHLORIDE: CPT

## 2025-05-03 PROCEDURE — 80069 RENAL FUNCTION PANEL: CPT | Performed by: SURGERY

## 2025-05-03 PROCEDURE — 83605 ASSAY OF LACTIC ACID: CPT

## 2025-05-03 PROCEDURE — 84484 ASSAY OF TROPONIN QUANT: CPT | Performed by: SURGERY

## 2025-05-03 PROCEDURE — 37799 UNLISTED PX VASCULAR SURGERY: CPT

## 2025-05-03 PROCEDURE — 4A133B1 MONITORING OF ARTERIAL PRESSURE, PERIPHERAL, PERCUTANEOUS APPROACH: ICD-10-PCS | Performed by: SURGERY

## 2025-05-03 PROCEDURE — 36620 INSERTION CATHETER ARTERY: CPT

## 2025-05-03 PROCEDURE — 36415 COLL VENOUS BLD VENIPUNCTURE: CPT | Performed by: SURGERY

## 2025-05-03 PROCEDURE — 84132 ASSAY OF SERUM POTASSIUM: CPT

## 2025-05-03 PROCEDURE — 93005 ELECTROCARDIOGRAM TRACING: CPT

## 2025-05-03 PROCEDURE — 94640 AIRWAY INHALATION TREATMENT: CPT

## 2025-05-03 PROCEDURE — C8929 TTE W OR WO FOL WCON,DOPPLER: HCPCS

## 2025-05-03 PROCEDURE — 99223 1ST HOSP IP/OBS HIGH 75: CPT | Performed by: STUDENT IN AN ORGANIZED HEALTH CARE EDUCATION/TRAINING PROGRAM

## 2025-05-03 PROCEDURE — 03HY32Z INSERTION OF MONITORING DEVICE INTO UPPER ARTERY, PERCUTANEOUS APPROACH: ICD-10-PCS | Performed by: SURGERY

## 2025-05-03 PROCEDURE — 2500000004 HC RX 250 GENERAL PHARMACY W/ HCPCS (ALT 636 FOR OP/ED): Mod: JZ

## 2025-05-03 PROCEDURE — 83735 ASSAY OF MAGNESIUM: CPT | Performed by: SURGERY

## 2025-05-03 PROCEDURE — 94660 CPAP INITIATION&MGMT: CPT

## 2025-05-03 PROCEDURE — 87899 AGENT NOS ASSAY W/OPTIC: CPT | Mod: AHULAB

## 2025-05-03 PROCEDURE — P9045 ALBUMIN (HUMAN), 5%, 250 ML: HCPCS | Mod: JZ

## 2025-05-03 PROCEDURE — 2500000004 HC RX 250 GENERAL PHARMACY W/ HCPCS (ALT 636 FOR OP/ED): Performed by: SURGERY

## 2025-05-03 PROCEDURE — 76937 US GUIDE VASCULAR ACCESS: CPT

## 2025-05-03 PROCEDURE — 93306 TTE W/DOPPLER COMPLETE: CPT | Performed by: INTERNAL MEDICINE

## 2025-05-03 PROCEDURE — 2500000005 HC RX 250 GENERAL PHARMACY W/O HCPCS: Performed by: STUDENT IN AN ORGANIZED HEALTH CARE EDUCATION/TRAINING PROGRAM

## 2025-05-03 PROCEDURE — 85520 HEPARIN ASSAY: CPT | Performed by: SURGERY

## 2025-05-03 PROCEDURE — 2500000004 HC RX 250 GENERAL PHARMACY W/ HCPCS (ALT 636 FOR OP/ED): Mod: JZ | Performed by: STUDENT IN AN ORGANIZED HEALTH CARE EDUCATION/TRAINING PROGRAM

## 2025-05-03 PROCEDURE — 99291 CRITICAL CARE FIRST HOUR: CPT | Performed by: SURGERY

## 2025-05-03 PROCEDURE — 2500000002 HC RX 250 W HCPCS SELF ADMINISTERED DRUGS (ALT 637 FOR MEDICARE OP, ALT 636 FOR OP/ED): Performed by: SURGERY

## 2025-05-03 PROCEDURE — 87449 NOS EACH ORGANISM AG IA: CPT | Mod: AHULAB

## 2025-05-03 PROCEDURE — 2500000005 HC RX 250 GENERAL PHARMACY W/O HCPCS: Performed by: SURGERY

## 2025-05-03 RX ORDER — MORPHINE SULFATE 4 MG/ML
4 INJECTION, SOLUTION INTRAMUSCULAR; INTRAVENOUS
Status: DISCONTINUED | OUTPATIENT
Start: 2025-05-03 | End: 2025-05-04 | Stop reason: HOSPADM

## 2025-05-03 RX ORDER — DEXTROSE MONOHYDRATE 100 MG/ML
50 INJECTION, SOLUTION INTRAVENOUS CONTINUOUS
Status: DISCONTINUED | OUTPATIENT
Start: 2025-05-03 | End: 2025-05-03

## 2025-05-03 RX ORDER — CLINDAMYCIN PHOSPHATE 600 MG/50ML
600 INJECTION, SOLUTION INTRAVENOUS EVERY 8 HOURS
Status: DISCONTINUED | OUTPATIENT
Start: 2025-05-03 | End: 2025-05-03

## 2025-05-03 RX ORDER — HALOPERIDOL LACTATE 5 MG/ML
1 INJECTION, SOLUTION INTRAMUSCULAR EVERY 4 HOURS PRN
Status: DISCONTINUED | OUTPATIENT
Start: 2025-05-03 | End: 2025-05-04 | Stop reason: HOSPADM

## 2025-05-03 RX ORDER — MORPHINE SULFATE 4 MG/ML
4 INJECTION, SOLUTION INTRAMUSCULAR; INTRAVENOUS
Status: DISCONTINUED | OUTPATIENT
Start: 2025-05-03 | End: 2025-05-03

## 2025-05-03 RX ORDER — FENTANYL CITRATE 50 UG/ML
INJECTION, SOLUTION INTRAMUSCULAR; INTRAVENOUS
Status: COMPLETED
Start: 2025-05-03 | End: 2025-05-03

## 2025-05-03 RX ORDER — EPINEPHRINE IN 0.9 % SOD CHLOR 4MG/250ML
0-.2 PLASTIC BAG, INJECTION (ML) INTRAVENOUS CONTINUOUS
Status: DISCONTINUED | OUTPATIENT
Start: 2025-05-03 | End: 2025-05-03

## 2025-05-03 RX ORDER — LINEZOLID 2 MG/ML
600 INJECTION, SOLUTION INTRAVENOUS EVERY 12 HOURS
Status: DISCONTINUED | OUTPATIENT
Start: 2025-05-03 | End: 2025-05-03

## 2025-05-03 RX ORDER — KETOROLAC TROMETHAMINE 30 MG/ML
15 INJECTION, SOLUTION INTRAMUSCULAR; INTRAVENOUS EVERY 6 HOURS PRN
Status: DISCONTINUED | OUTPATIENT
Start: 2025-05-03 | End: 2025-05-04 | Stop reason: HOSPADM

## 2025-05-03 RX ORDER — LORAZEPAM 2 MG/ML
0.5 INJECTION INTRAMUSCULAR EVERY 4 HOURS PRN
Status: DISCONTINUED | OUTPATIENT
Start: 2025-05-03 | End: 2025-05-04 | Stop reason: HOSPADM

## 2025-05-03 RX ORDER — CALCIUM GLUCONATE 20 MG/ML
2 INJECTION, SOLUTION INTRAVENOUS ONCE
Status: COMPLETED | OUTPATIENT
Start: 2025-05-03 | End: 2025-05-03

## 2025-05-03 RX ORDER — ALBUMIN HUMAN 50 G/1000ML
25 SOLUTION INTRAVENOUS ONCE
Status: COMPLETED | OUTPATIENT
Start: 2025-05-03 | End: 2025-05-03

## 2025-05-03 RX ORDER — DEXTROSE 50 % IN WATER (D50W) INTRAVENOUS SYRINGE
25 ONCE
Status: COMPLETED | OUTPATIENT
Start: 2025-05-03 | End: 2025-05-03

## 2025-05-03 RX ORDER — ALBUTEROL SULFATE 0.83 MG/ML
2.5 SOLUTION RESPIRATORY (INHALATION) EVERY 4 HOURS PRN
Status: DISCONTINUED | OUTPATIENT
Start: 2025-05-03 | End: 2025-05-04 | Stop reason: HOSPADM

## 2025-05-03 RX ORDER — FENTANYL CITRATE 50 UG/ML
25 INJECTION, SOLUTION INTRAMUSCULAR; INTRAVENOUS ONCE
Status: COMPLETED | OUTPATIENT
Start: 2025-05-03 | End: 2025-05-03

## 2025-05-03 RX ORDER — LORAZEPAM 2 MG/ML
2 INJECTION INTRAMUSCULAR EVERY 10 MIN PRN
Status: DISCONTINUED | OUTPATIENT
Start: 2025-05-03 | End: 2025-05-03

## 2025-05-03 RX ORDER — FENTANYL CITRATE 50 UG/ML
25 INJECTION, SOLUTION INTRAMUSCULAR; INTRAVENOUS
Status: DISCONTINUED | OUTPATIENT
Start: 2025-05-03 | End: 2025-05-03

## 2025-05-03 RX ORDER — EPINEPHRINE IN 0.9 % SOD CHLOR 4MG/250ML
0-1 PLASTIC BAG, INJECTION (ML) INTRAVENOUS CONTINUOUS
Status: DISCONTINUED | OUTPATIENT
Start: 2025-05-03 | End: 2025-05-03

## 2025-05-03 RX ADMIN — INSULIN LISPRO 2 UNITS: 100 INJECTION, SOLUTION INTRAVENOUS; SUBCUTANEOUS at 12:18

## 2025-05-03 RX ADMIN — Medication 60 L/MIN: at 10:38

## 2025-05-03 RX ADMIN — FENTANYL CITRATE 25 MCG: 0.05 INJECTION, SOLUTION INTRAMUSCULAR; INTRAVENOUS at 03:03

## 2025-05-03 RX ADMIN — FENTANYL CITRATE 25 MCG: 50 INJECTION, SOLUTION INTRAMUSCULAR; INTRAVENOUS at 01:39

## 2025-05-03 RX ADMIN — VASOPRESSIN 0.03 UNITS/MIN: 0.2 INJECTION INTRAVENOUS at 00:21

## 2025-05-03 RX ADMIN — FENTANYL CITRATE 25 MCG: 0.05 INJECTION, SOLUTION INTRAMUSCULAR; INTRAVENOUS at 01:39

## 2025-05-03 RX ADMIN — PERFLUTREN 2 ML OF DILUTION: 6.52 INJECTION, SUSPENSION INTRAVENOUS at 09:00

## 2025-05-03 RX ADMIN — Medication 70 PERCENT: at 03:03

## 2025-05-03 RX ADMIN — PIPERACILLIN SODIUM AND TAZOBACTAM SODIUM 3.38 G: 3; .375 INJECTION, SOLUTION INTRAVENOUS at 05:38

## 2025-05-03 RX ADMIN — MORPHINE SULFATE 2 MG: 2 INJECTION, SOLUTION INTRAMUSCULAR; INTRAVENOUS at 08:48

## 2025-05-03 RX ADMIN — DEXTROSE 50 ML/HR: 10 SOLUTION INTRAVENOUS at 06:54

## 2025-05-03 RX ADMIN — INSULIN LISPRO 4 UNITS: 100 INJECTION, SOLUTION INTRAVENOUS; SUBCUTANEOUS at 08:23

## 2025-05-03 RX ADMIN — DEXMEDETOMIDINE HYDROCHLORIDE 1.5 MCG/KG/HR: 4 INJECTION, SOLUTION INTRAVENOUS at 08:48

## 2025-05-03 RX ADMIN — EPINEPHRINE IN SODIUM CHLORIDE 0.03 MCG/KG/MIN: 16 INJECTION INTRAVENOUS at 10:32

## 2025-05-03 RX ADMIN — SODIUM CHLORIDE SOLN NEBU 3% 3 ML: 3 NEBU SOLN at 07:16

## 2025-05-03 RX ADMIN — FENTANYL CITRATE 25 MCG: 0.05 INJECTION, SOLUTION INTRAMUSCULAR; INTRAVENOUS at 05:05

## 2025-05-03 RX ADMIN — HYDROCORTISONE SODIUM SUCCINATE 50 MG: 100 INJECTION, POWDER, FOR SOLUTION INTRAMUSCULAR; INTRAVENOUS at 12:18

## 2025-05-03 RX ADMIN — AMPICILLIN SODIUM AND SULBACTAM SODIUM 3 G: 2; 1 INJECTION, POWDER, FOR SOLUTION INTRAMUSCULAR; INTRAVENOUS at 12:18

## 2025-05-03 RX ADMIN — LORAZEPAM 0.5 MG: 2 INJECTION INTRAMUSCULAR; INTRAVENOUS at 13:28

## 2025-05-03 RX ADMIN — MORPHINE SULFATE 2 MG: 2 INJECTION, SOLUTION INTRAMUSCULAR; INTRAVENOUS at 00:24

## 2025-05-03 RX ADMIN — MORPHINE SULFATE 4 MG: 4 INJECTION, SOLUTION INTRAMUSCULAR; INTRAVENOUS at 15:11

## 2025-05-03 RX ADMIN — PIPERACILLIN SODIUM AND TAZOBACTAM SODIUM 3.38 G: 3; .375 INJECTION, SOLUTION INTRAVENOUS at 00:21

## 2025-05-03 RX ADMIN — HYDROCORTISONE SODIUM SUCCINATE 50 MG: 100 INJECTION, POWDER, FOR SOLUTION INTRAMUSCULAR; INTRAVENOUS at 00:24

## 2025-05-03 RX ADMIN — DEXTROSE MONOHYDRATE 25 G: 25 INJECTION, SOLUTION INTRAVENOUS at 06:53

## 2025-05-03 RX ADMIN — ALBUMIN HUMAN 25 G: 0.05 INJECTION, SOLUTION INTRAVENOUS at 01:03

## 2025-05-03 RX ADMIN — DEXMEDETOMIDINE HYDROCHLORIDE 1.5 MCG/KG/HR: 4 INJECTION, SOLUTION INTRAVENOUS at 05:03

## 2025-05-03 RX ADMIN — CALCIUM GLUCONATE 2 G: 20 INJECTION, SOLUTION INTRAVENOUS at 08:23

## 2025-05-03 RX ADMIN — INSULIN HUMAN 10 UNITS: 100 INJECTION, SOLUTION PARENTERAL at 06:53

## 2025-05-03 RX ADMIN — Medication 70 PERCENT: at 07:16

## 2025-05-03 RX ADMIN — SODIUM CHLORIDE SOLN NEBU 3% 3 ML: 3 NEBU SOLN at 10:38

## 2025-05-03 RX ADMIN — EPINEPHRINE IN SODIUM CHLORIDE 0.03 MCG/KG/MIN: 16 INJECTION INTRAVENOUS at 10:23

## 2025-05-03 RX ADMIN — ALBUTEROL SULFATE 3 ML: 2.5 SOLUTION RESPIRATORY (INHALATION) at 10:38

## 2025-05-03 RX ADMIN — MORPHINE SULFATE 4 MG: 4 INJECTION, SOLUTION INTRAMUSCULAR; INTRAVENOUS at 13:25

## 2025-05-03 RX ADMIN — INSULIN LISPRO 2 UNITS: 100 INJECTION, SOLUTION INTRAVENOUS; SUBCUTANEOUS at 04:35

## 2025-05-03 RX ADMIN — MORPHINE SULFATE 4 MG: 4 INJECTION, SOLUTION INTRAMUSCULAR; INTRAVENOUS at 18:36

## 2025-05-03 RX ADMIN — HYDROCORTISONE SODIUM SUCCINATE 50 MG: 100 INJECTION, POWDER, FOR SOLUTION INTRAMUSCULAR; INTRAVENOUS at 05:03

## 2025-05-03 RX ADMIN — VASOPRESSIN 0.03 UNITS/MIN: 0.2 INJECTION INTRAVENOUS at 08:48

## 2025-05-03 RX ADMIN — ALBUTEROL SULFATE 3 ML: 2.5 SOLUTION RESPIRATORY (INHALATION) at 07:16

## 2025-05-03 RX ADMIN — CLINDAMYCIN PHOSPHATE 600 MG: 600 INJECTION, SOLUTION INTRAVENOUS at 10:18

## 2025-05-03 ASSESSMENT — COGNITIVE AND FUNCTIONAL STATUS - GENERAL
DAILY ACTIVITIY SCORE: 18
DRESSING REGULAR LOWER BODY CLOTHING: TOTAL
HELP NEEDED FOR BATHING: TOTAL
MOBILITY SCORE: 18
DRESSING REGULAR UPPER BODY CLOTHING: A LITTLE
MOVING TO AND FROM BED TO CHAIR: A LITTLE
TOILETING: TOTAL
MOBILITY SCORE: 6
STANDING UP FROM CHAIR USING ARMS: A LITTLE
MOVING FROM LYING ON BACK TO SITTING ON SIDE OF FLAT BED WITH BEDRAILS: TOTAL
WALKING IN HOSPITAL ROOM: A LOT
PERSONAL GROOMING: A LITTLE
STANDING UP FROM CHAIR USING ARMS: TOTAL
EATING MEALS: TOTAL
WALKING IN HOSPITAL ROOM: TOTAL
CLIMB 3 TO 5 STEPS WITH RAILING: A LOT
DAILY ACTIVITIY SCORE: 6
TOILETING: A LITTLE
CLIMB 3 TO 5 STEPS WITH RAILING: TOTAL
EATING MEALS: TOTAL
MOVING TO AND FROM BED TO CHAIR: TOTAL
DRESSING REGULAR UPPER BODY CLOTHING: TOTAL
PERSONAL GROOMING: TOTAL
TURNING FROM BACK TO SIDE WHILE IN FLAT BAD: TOTAL

## 2025-05-03 ASSESSMENT — RESPIRATORY DISTRESS OBSERVATION SCALE (RDOS)
RESTLESS NONPURPOSEFUL MOVEMENTS: 1 - OCCASIONAL, SLIGHT MOVEMENTS
LOOK OF FEAR: 0 - NONE
HEART RATE PER MINUTE: 0 - <90 BEATS
RESPIRATORY RATE PER MINUTE: 2 - >30 BREATHS
RDOS TOTAL SCORE: 5
RESPIRATORY RATE PER MINUTE: 2 - >30 BREATHS
INVOLUNTARY NASAL FLARING: 0 - NONE
PARADOXICAL BREATHING PATTERN: 2 - PRESENT
HEART RATE PER MINUTE: 0 - <90 BEATS
PARADOXICAL BREATHING PATTERN: 2 - PRESENT
RDOS TOTAL SCORE: 5
GRUNTING AT END OF EXPIRATION: 0 - NONE
GRUNTING AT END OF EXPIRATION: 0 - NONE
ACCESSORY MUSCLE RISE IN CLAVICLE DURING INSPIRATION: 0 - NONE
LOOK OF FEAR: 0 - NONE
ACCESSORY MUSCLE RISE IN CLAVICLE DURING INSPIRATION: 0 - NONE
INVOLUNTARY NASAL FLARING: 0 - NONE
RESTLESS NONPURPOSEFUL MOVEMENTS: 1 - OCCASIONAL, SLIGHT MOVEMENTS

## 2025-05-03 ASSESSMENT — PAIN SCALES - GENERAL
PAINLEVEL_OUTOF10: 8
PAINLEVEL_OUTOF10: 0 - NO PAIN
PAINLEVEL_OUTOF10: 5 - MODERATE PAIN
PAINLEVEL_OUTOF10: 0 - NO PAIN
PAINLEVEL_OUTOF10: 10 - WORST POSSIBLE PAIN

## 2025-05-03 ASSESSMENT — PAIN - FUNCTIONAL ASSESSMENT
PAIN_FUNCTIONAL_ASSESSMENT: UNABLE TO SELF-REPORT
PAIN_FUNCTIONAL_ASSESSMENT: CPOT (CRITICAL CARE PAIN OBSERVATION TOOL)
PAIN_FUNCTIONAL_ASSESSMENT: 0-10
PAIN_FUNCTIONAL_ASSESSMENT: WONG-BAKER FACES
PAIN_FUNCTIONAL_ASSESSMENT: 0-10

## 2025-05-03 ASSESSMENT — PAIN SCALES - WONG BAKER: WONGBAKER_NUMERICALRESPONSE: HURTS WORST

## 2025-05-03 NOTE — CARE PLAN
The patient's goals for the shift include      The clinical goals for the shift include wean off Bipap, pressors    Problem: Fall/Injury  Goal: Not fall by end of shift  Outcome: Progressing  Goal: Be free from injury by end of the shift  Outcome: Progressing  Goal: Verbalize understanding of personal risk factors for fall in the hospital  Outcome: Progressing  Goal: Verbalize understanding of risk factor reduction measures to prevent injury from fall in the home  Outcome: Progressing  Goal: Use assistive devices by end of the shift  Outcome: Progressing  Goal: Pace activities to prevent fatigue by end of the shift  Outcome: Progressing     Problem: Pain - Adult  Goal: Verbalizes/displays adequate comfort level or baseline comfort level  Outcome: Progressing     Problem: Safety - Adult  Goal: Free from fall injury  Outcome: Progressing     Problem: Discharge Planning  Goal: Discharge to home or other facility with appropriate resources  Outcome: Progressing     Problem: Nutrition  Goal: Nutrient intake appropriate for maintaining nutritional needs  Outcome: Progressing     Problem: Pain  Goal: Takes deep breaths with improved pain control throughout the shift  Outcome: Progressing  Goal: Turns in bed with improved pain control throughout the shift  Outcome: Progressing  Goal: Walks with improved pain control throughout the shift  Outcome: Progressing  Goal: Performs ADL's with improved pain control throughout shift  Outcome: Progressing  Goal: Participates in PT with improved pain control throughout the shift  Outcome: Progressing  Goal: Free from opioid side effects throughout the shift  Outcome: Progressing  Goal: Free from acute confusion related to pain meds throughout the shift  Outcome: Progressing     Problem: Respiratory  Goal: Clear secretions with interventions this shift  Outcome: Progressing  Goal: Minimize anxiety/maximize coping throughout shift  Outcome: Progressing  Goal: Minimal/no exertional  discomfort or dyspnea this shift  Outcome: Progressing  Goal: No signs of respiratory distress (eg. Use of accessory muscles. Peds grunting)  Outcome: Progressing  Goal: Patent airway maintained this shift  Outcome: Progressing  Goal: Tolerate mechanical ventilation evidenced by VS/agitation level this shift  Outcome: Progressing  Goal: Tolerate pulmonary toileting this shift  Outcome: Progressing  Goal: Verbalize decreased shortness of breath this shift  Outcome: Progressing  Goal: Wean oxygen to maintain O2 saturation per order/standard this shift  Outcome: Progressing  Goal: Increase self care and/or family involvement in next 24 hours  Outcome: Progressing

## 2025-05-03 NOTE — NURSING NOTE
1245: After discussion with family during MDR's patient's family wishes for patient to be DNR-Comfort care measures only. All aggressive measures/interventions cease from this point on. All continuous infusions to be discontinued per family wishes (Vasopressors and IVF, Heparin gtt). Family at the bedside, updated on plan of care. Pt turned and repositioned for comfort. Cleaned up from stool incontinence. Remains on HFNC being weaned down by RT. No signs of distress noted at this time. IV Ativan and Morphine available for air hunger/restlessness if needed.     1335: Right radial A-line discontinued at this time per orders. Pt Comfort care measure only. VS Qshift. Continuous BP monitoring discontinued per MD orders. Family at the bedside. Emotional support provided.

## 2025-05-03 NOTE — PROCEDURES
"Arterial Puncture/Cannulation     Date/Time: 5/2/2025 0140     Performed by: Angela CANNON CNP  Authorized by: Angela CANNON CNP    Consent: Procedure Emergent  Risks and benefits: risks, benefits and alternatives were discussed  Consent given by: Emergent  Patient identity confirmed: arm band and hospital-assigned identification number  Time out: Immediately prior to procedure a \"time out\" was called to verify the correct patient, procedure, equipment, support staff and site/side marked as required.  Preparation: Patient was prepped and draped in the usual sterile fashion.  Local anesthesia used: yes  Anesthesia: local infiltration     Anesthesia:  Local anesthesia used: yes  Local Anesthetic: lidocaine 1% without epinephrine  Anesthetic total: 1.7 mL  Patient tolerance: Patient tolerated the procedure well with no immediate complications  Comments: The patient's identity was verified.  Modified Everton's test was performed and suggestive of adequate collateral flow.  The patient was prepped and draped in the usual sterile fashion with chlorhexidine.  Sterile precautions including but not limited to sterile gown, gloves, mask, eye shield, and hair cover were used.  The target vessel, the Right radial artery was visualized via US and accessed with the manufacturers provided needle with a single attempt.  Brisk pulsatile blood was noted. The 's provided guidewire was easily advanced without resistance into the vessel.  The catheter was advanced via Seldinger technique over the guidewire, and the guidewire was removed.  An arterial waveform was observed upon connection of the catheter to the transducer. Catheter was sutured in place and Tegaderm with CHG gel placed over catheter.  The patient tolerated the procedure well with no immediate complications noted.    "

## 2025-05-03 NOTE — CONSULTS
Inpatient consult to Cardiology  Consult performed by: Carl MARIANO MD  Consult ordered by: Samantha Johnson MD  Reason for consult: CHF/ CM        History Of Present Illness:      Nik Washington is a 89 y.o. male with a past medical history of CAD, CKD, HTN, type 2 DM, newly diagnosed cardiomyopathy with ejection fraction of 25%, grade 1 diastolic dysfunction echocardiogram performed 5/3/2025 who presented to the ED 5/2 with cough and dyspnea found to have possible pneumonia with blood cultures positive for group B strep.  Cardiology consulted for heart failure management.    A full hospital course review was completed. Patient lives independently. Patient unable to provide detailed history at this time. Was noted to have severe hypoxia and is on airvo in ICU. He is on levophed. Temp 100.4 on presentation. WBC count 15. Cr 3.5. Blood cultures were obtained and now positive for group A Strep. He is on zosyn, azithromycin, and clindamycin. CT chest showed possible pneumonia.      Last Recorded Vitals:  Vitals:    05/03/25 1030 05/03/25 1038 05/03/25 1045 05/03/25 1100   BP:       BP Location:       Patient Position:       Pulse:    63   Resp:    (!) 37   Temp:       TempSrc:       SpO2: 100% 100% 100% 96%   Weight:       Height:           Last Labs:  CBC - 5/3/2025:  4:17 AM  17.6 10.1 103    29.2      CMP - 5/3/2025:  4:17 AM  8.0 6.2 404 --- 1.0   7.8 3.3 76 21      PTT - 5/2/2025: 12:55 PM  1.1   12.5 31     Troponin I, High Sensitivity   Date/Time Value Ref Range Status   05/03/2025 08:35 AM 26,161 (HH) 0 - 20 ng/L Final     Comment:     Previous result verified on 5/2/2025 1208 on specimen/case [id number] called with component TRPHS for procedure Troponin I, High Sensitivity, Initial with value 1,970 ng/L.   05/02/2025 08:09 PM 13,856 (HH) 0 - 20 ng/L Final     Comment:     Previous result verified on 5/2/2025 1208 on specimen/case [id number] called with component TRPHS for procedure Troponin I,  High Sensitivity, Initial with value 1,970 ng/L.   05/02/2025 08:08 PM 14,613 (HH) 0 - 20 ng/L Final     Comment:     Previous result verified on 5/2/2025 1208 on specimen/case [id number] called with component [facility] for procedure Troponin I, High Sensitivity, Initial with value 1,970 ng/L.     BNP   Date/Time Value Ref Range Status   05/02/2025 11:10  (H) 0 - 99 pg/mL Final     TR HGBA1C (Data Conversion)   Date/Time Value Ref Range Status   06/01/2020 03:30 PM 7.2 4.2 - 6.5 % Final   02/27/2019 10:36 AM 7.4 4.2 - 6.5 % Final     POC HEMOGLOBIN A1c   Date/Time Value Ref Range Status   03/20/2025 09:07 AM 7.1 (A) 4.2 - 6.5 % Final   12/19/2024 11:40 AM 7.6 (A) 4.2 - 6.5 % Final     LDL Calculated   Date/Time Value Ref Range Status   09/17/2024 07:41 AM 64 <=99 mg/dL Final     Comment:                                 Near   Borderline      AGE      Desirable  Optimal    High     High     Very High     0-19 Y     0 - 109     ---    110-129   >/= 130     ----    20-24 Y     0 - 119     ---    120-159   >/= 160     ----      >24 Y     0 -  99   100-129  130-159   160-189     >/=190       VLDL   Date/Time Value Ref Range Status   09/17/2024 07:41 AM 15 0 - 40 mg/dL Final   09/12/2023 08:25 AM 22 0 - 40 mg/dL Final   09/29/2021 10:54 AM 21 0 - 40 mg/dL Final   12/30/2019 09:00 AM 21 0 - 40 mg/dL Final      Last I/O:  I/O last 3 completed shifts:  In: 1197.2 (14.4 mL/kg) [I.V.:547.2 (6.6 mL/kg); IV Piggyback:650]  Out: 800 (9.6 mL/kg) [Urine:800 (0.3 mL/kg/hr)]  Weight: 83.3 kg     Past Cardiology Tests (Last 3 Years):  EKG:  ECG 12 lead 05/02/2025      ECG 12 lead 05/02/2025      ECG 12 Lead 03/14/2025      ECG 12 Lead 09/20/2024      ECG 12 Lead 03/08/2024    Echo:  Transthoracic Echo Complete 05/03/2025    Ejection Fractions:  EF   Date/Time Value Ref Range Status   05/03/2025 08:31 AM 25 %      Cath:  No results found for this or any previous visit from the past 1095 days.    Stress Test:  No results found  for this or any previous visit from the past 1095 days.    Cardiac Imaging:  No results found for this or any previous visit from the past 1095 days.      Past Medical History:  He has a past medical history of Class 1 obesity without serious comorbidity with body mass index (BMI) of 30.0 to 30.9 in adult (03/08/2024), Elevated prostate specific antigen (PSA) (02/23/2021), Encounter for general adult medical examination without abnormal findings (09/29/2021), Other conditions influencing health status (05/26/2021), Other specified symptoms and signs involving the circulatory and respiratory systems, Personal history of other diseases of the nervous system and sense organs (12/01/2017), Personal history of other drug therapy (09/29/2021), Personal history of other endocrine, nutritional and metabolic disease, and Personal history of other malignant neoplasm of skin.    Past Surgical History:  He has a past surgical history that includes Carpal tunnel release (Bilateral, 01/26/2017); Colonoscopy (02/27/2019); and Cataract extraction (03/07/2017).      Social History:  He reports that he quit smoking about 32 years ago. His smoking use included cigarettes. He started smoking about 62 years ago. He has a 30 pack-year smoking history. He has never used smokeless tobacco. He reports that he does not currently use alcohol. He reports that he does not use drugs.    Family History:  Family History[1]     Allergies:  Patient has no known allergies.    Inpatient Medications:  Scheduled Medications[2]  PRN Medications[3]  Continuous Medications[4]  Outpatient Medications:  Current Outpatient Medications   Medication Instructions    acetaminophen (TYLENOL) 1,000 mg, Every 6 hours PRN    amLODIPine (NORVASC) 5 mg, Daily    aspirin 81 mg EC tablet 1 tablet, Daily    blood sugar diagnostic (Accu-Chek Guide test strips) TEST ONCE DAILY    calcitriol (ROCALTROL) 0.25 mcg, Every other day    cholecalciferol (Vitamin D-3) 25 MCG (1000  UT) capsule Take by mouth.    glimepiride (AmaryL) 1 mg tablet 0.5 mg daily    lancets (Accu-Chek Fastclix Lancet Drum) misc 1 each, miscellaneous, Daily    simvastatin (ZOCOR) 40 mg, oral, Daily    terazosin (HYTRIN) 5 mg, oral, Daily       Physical Exam:  General:  Patient is awake, alert, and oriented.    HEENT:  Pupils equal and reactive.  Normocephalic.  Moist mucosa.    Neck:  No thyromegaly.  Normal Jugular Venous Pressure.  Cardiovascular:  Regular rate and rhythm.  Normal S1 and S2.  Pulmonary:  Coarse breath sounds on airvo   Abdomen:  Soft. Non-tender.   Non-distended.  Positive bowel sounds.  Lower Extremities:  2+ pedal pulses. No LE edema.  Neurologic:  Cranial nerves intact.  No focal deficit.   Skin: Skin warm and dry, normal skin turgor.   Psychiatric: Normal affect.      Assessment/Plan   Nik Washington is a 89 y.o. male with a past medical history of CAD, CKD, HTN, type 2 DM, newly diagnosed cardiomyopathy with ejection fraction of 25%, grade 1 diastolic dysfunction echocardiogram performed 5/3/2025 who presented to the ED 5/2 with cough and dyspnea found to have possible pneumonia with blood cultures positive for group B strep.  Cardiology consulted for heart failure management.      BNP is significantly elevated at 939 consistent with acute on chronic systolic and diastolic heart failure.  Acute on admission was elevated at 5.3 now slightly down trending.  Patient does have progressive acute on chronic renal insufficiency with creatinine uptrending from 2.25-3.51.  Potassium was significantly elevated at 6.3.  Troponin is significantly elevated from 1970 and continues to trend up to [address] close consistent with type II ischemia in the setting of sepsis with likely stable underlying coronary artery disease as well as cardiomyopathy and multiorgan dysfunction.      Overall prognosis is poor.    5/2 CT PE:  No evidence of pulmonary embolism.  Pulmonary edema with small bilateral pleural  effusions. [place]  opacities dependently favored to be due to atelectasis.     5/2 CT abd/pelvis:  1.  Stable renal masses bilaterally.  2. Mild diverticulosis of the sigmoid colon.  3. Otherwise no acute findings.    Agree with ongoing supportive management including ICU.    Agree with ongoing goals of care discussion.  Family have agreed to palliative care.    Code Status:  DNR and No Intubation      Thank you for allowing me to participate in their care.  Please feel free to call me with any further questions or concerns.        Carl Manrique MD, FACC, SANGITA KENNEDY  Division of Cardiovascular Medicine  System Director, Nuclear Cardiology   Medical Director, [facility] [place], [facility]   Clinical , [employer]  [email or web address]   Office:  [phone]               [1]   Family History  Problem Relation Name Age of Onset    Arthritis Mother      Other (CARDIAC DISORDER) Mother      Diabetes Mother      Hypertension Mother      Diabetes Father      Diabetes Sister      Diabetes Brother      Diabetes Sibling     [2]   Scheduled medications   Medication Dose Route Frequency    acetaminophen  650 mg oral TID    albuterol  3 mL nebulization 4x daily    ampicillin-sulbactam  3 g intravenous q24h    aspirin  81 mg oral Daily    calcitriol  0.25 mcg oral Every other day    dextromethorphan-guaifenesin  5 mL oral q4h    hydrocortisone sodium succinate  50 mg intravenous q6h    insulin lispro  0-10 Units subcutaneous q4h    linezolid  600 mg intravenous q12h    oxygen   inhalation Continuous - Inhalation    oxygen   inhalation Continuous - Inhalation    sodium chloride  3 mL nebulization 4x daily   [3]   PRN medications   Medication    dextrose    dextrose    fentaNYL    glucagon    glucagon    heparin    morphine    oxygen   [4]   Continuous Medications   Medication Dose Last Rate     dexmedeTOMIDine  0-1.5 mcg/kg/hr 1.5 mcg/kg/hr (05/03/25 0848)    dextrose 10 % in water (D10W)  50 mL/hr 50 mL/hr (05/03/25 0730)    EPINEPHrine  0-0.2 mcg/kg/min (Adjusted) 0.03 mcg/kg/min (05/03/25 1032)    heparin  0-4,000 Units/hr 960 Units/hr (05/03/25 0745)    norepinephrine  0-0.5 mcg/kg/min Stopped (05/03/25 1015)    vasopressin  0.03 Units/min 0.03 Units/min (05/03/25 0921)

## 2025-05-03 NOTE — PROGRESS NOTES
"Nik Washington is a 89 y.o. male on day 1 of admission presenting with Hypoxia.    Subjective   Patient remained on high oxygen support overnight and was started on vasopressors. Mental status is depressed-- pt is still arousable but no longer conversant. Worsening JAG with little UOP today. ECHO with EF 25% (this is new information per family as pt has no known hx of heart failure). Blood cultures returned group A strep.        Objective     Physical Exam  Constitutional:       Appearance: He is ill-appearing.   HENT:      Mouth/Throat:      Mouth: Mucous membranes are dry.   Eyes:      Pupils: Pupils are equal, round, and reactive to light.   Cardiovascular:      Rate and Rhythm: Normal rate and regular rhythm.   Pulmonary:      Breath sounds: Wheezing and rhonchi present.      Comments: Ongoing tachypnea rate 30s        Last Recorded Vitals  Blood pressure 109/64, pulse 65, temperature 36 °C (96.8 °F), temperature source Temporal, resp. rate (!) 40, height 1.622 m (5' 3.86\"), weight 83.3 kg (183 lb 10.3 oz), SpO2 (!) 86%.  Intake/Output last 3 Shifts:  I/O last 3 completed shifts:  In: 1197.2 (14.4 mL/kg) [I.V.:547.2 (6.6 mL/kg); IV Piggyback:650]  Out: 800 (9.6 mL/kg) [Urine:800 (0.3 mL/kg/hr)]  Weight: 83.3 kg     Relevant Results  Scheduled medications  Scheduled Medications[1]  Continuous medications  Continuous Medications[2]  PRN medications  PRN Medications[3]  Results for orders placed or performed during the hospital encounter of 05/02/25 (from the past 24 hours)   MRSA Surveillance for Vancomycin De-escalation, PCR    Specimen: Anterior Nares; Swab   Result Value Ref Range    MRSA PCR Not Detected Not Detected   Blood Gas Arterial Full Panel   Result Value Ref Range    POCT pH, Arterial 7.39 7.38 - 7.42 pH    POCT pCO2, Arterial 26 (L) 38 - 42 mm Hg    POCT pO2, Arterial 79 (L) 85 - 95 mm Hg    POCT SO2, Arterial 99 94 - 100 %    POCT Oxy Hemoglobin, Arterial 96.3 94.0 - 98.0 %    POCT Hematocrit " Calculated, Arterial 34.0 (L) 41.0 - 52.0 %    POCT Sodium, Arterial 129 (L) 136 - 145 mmol/L    POCT Potassium, Arterial 4.0 3.5 - 5.3 mmol/L    POCT Chloride, Arterial 100 98 - 107 mmol/L    POCT Ionized Calcium, Arterial 1.10 1.10 - 1.33 mmol/L    POCT Glucose, Arterial 168 (H) 74 - 99 mg/dL    POCT Lactate, Arterial 3.8 (H) 0.4 - 2.0 mmol/L    POCT Base Excess, Arterial -7.9 (L) -2.0 - 3.0 mmol/L    POCT HCO3 Calculated, Arterial 15.7 (L) 22.0 - 26.0 mmol/L    POCT Hemoglobin, Arterial 11.2 (L) 13.5 - 17.5 g/dL    POCT Anion Gap, Arterial 17 10 - 25 mmo/L    Patient Temperature 37.0 degrees Celsius    FiO2 60 %   POCT GLUCOSE   Result Value Ref Range    POCT Glucose 150 (H) 74 - 99 mg/dL   Troponin I, High Sensitivity   Result Value Ref Range    Troponin I, High Sensitivity 14,613 (HH) 0 - 20 ng/L   Renal Function Panel   Result Value Ref Range    Glucose 157 (H) 74 - 99 mg/dL    Sodium 133 (L) 136 - 145 mmol/L    Potassium 3.9 3.5 - 5.3 mmol/L    Chloride 102 98 - 107 mmol/L    Bicarbonate 17 (L) 21 - 32 mmol/L    Anion Gap 18 10 - 20 mmol/L    Urea Nitrogen 49 (H) 6 - 23 mg/dL    Creatinine 2.92 (H) 0.50 - 1.30 mg/dL    eGFR 20 (L) >60 mL/min/1.73m*2    Calcium 7.7 (L) 8.6 - 10.3 mg/dL    Phosphorus 4.2 2.5 - 4.9 mg/dL    Albumin 3.4 3.4 - 5.0 g/dL   Magnesium   Result Value Ref Range    Magnesium 1.70 1.60 - 2.40 mg/dL   Troponin I, High Sensitivity   Result Value Ref Range    Troponin I, High Sensitivity 13,856 (HH) 0 - 20 ng/L   Lactate   Result Value Ref Range    Lactate 5.3 (HH) 0.4 - 2.0 mmol/L   Blood Gas Lactic Acid, Venous   Result Value Ref Range    POCT Lactate, Venous 5.5 (HH) 0.4 - 2.0 mmol/L   Lactate   Result Value Ref Range    Lactate 4.8 (HH) 0.4 - 2.0 mmol/L   Heparin Assay, UFH   Result Value Ref Range    Heparin Unfractionated 0.6 See Comment Below for Therapeutic Ranges IU/mL   Blood Gas Arterial Full Panel   Result Value Ref Range    POCT pH, Arterial 7.38 7.38 - 7.42 pH    POCT pCO2,  Arterial 28 (L) 38 - 42 mm Hg    POCT pO2, Arterial 99 (H) 85 - 95 mm Hg    POCT SO2, Arterial 99 94 - 100 %    POCT Oxy Hemoglobin, Arterial 97.5 94.0 - 98.0 %    POCT Hematocrit Calculated, Arterial 39.0 (L) 41.0 - 52.0 %    POCT Sodium, Arterial 128 (L) 136 - 145 mmol/L    POCT Potassium, Arterial 4.4 3.5 - 5.3 mmol/L    POCT Chloride, Arterial 100 98 - 107 mmol/L    POCT Ionized Calcium, Arterial 1.07 (L) 1.10 - 1.33 mmol/L    POCT Glucose, Arterial 156 (H) 74 - 99 mg/dL    POCT Lactate, Arterial 3.3 (H) 0.4 - 2.0 mmol/L    POCT Base Excess, Arterial -7.1 (L) -2.0 - 3.0 mmol/L    POCT HCO3 Calculated, Arterial 16.6 (L) 22.0 - 26.0 mmol/L    POCT Hemoglobin, Arterial 13.1 (L) 13.5 - 17.5 g/dL    POCT Anion Gap, Arterial 16 10 - 25 mmo/L    Patient Temperature 37.0 degrees Celsius    FiO2 70 %   POCT GLUCOSE   Result Value Ref Range    POCT Glucose 139 (H) 74 - 99 mg/dL   Heparin Assay, UFH   Result Value Ref Range    Heparin Unfractionated 0.6 See Comment Below for Therapeutic Ranges IU/mL   Magnesium   Result Value Ref Range    Magnesium 2.19 1.60 - 2.40 mg/dL   CBC   Result Value Ref Range    WBC 17.6 (H) 4.4 - 11.3 x10*3/uL    nRBC 0.0 0.0 - 0.0 /100 WBCs    RBC 3.17 (L) 4.50 - 5.90 x10*6/uL    Hemoglobin 10.1 (L) 13.5 - 17.5 g/dL    Hematocrit 29.2 (L) 41.0 - 52.0 %    MCV 92 80 - 100 fL    MCH 31.9 26.0 - 34.0 pg    MCHC 34.6 32.0 - 36.0 g/dL    RDW 13.3 11.5 - 14.5 %    Platelets 103 (L) 150 - 450 x10*3/uL   Renal Function Panel   Result Value Ref Range    Glucose 165 (H) 74 - 99 mg/dL    Sodium 131 (L) 136 - 145 mmol/L    Potassium 6.1 (HH) 3.5 - 5.3 mmol/L    Chloride 101 98 - 107 mmol/L    Bicarbonate 11 (L) 21 - 32 mmol/L    Anion Gap 25 (H) 10 - 20 mmol/L    Urea Nitrogen 58 (H) 6 - 23 mg/dL    Creatinine 3.51 (H) 0.50 - 1.30 mg/dL    eGFR 16 (L) >60 mL/min/1.73m*2    Calcium 8.0 (L) 8.6 - 10.3 mg/dL    Phosphorus 7.8 (H) 2.5 - 4.9 mg/dL    Albumin 3.3 (L) 3.4 - 5.0 g/dL   POCT GLUCOSE   Result Value  Ref Range    POCT Glucose 156 (H) 74 - 99 mg/dL   Heparin Assay, UFH   Result Value Ref Range    Heparin Unfractionated 0.6 See Comment Below for Therapeutic Ranges IU/mL   Potassium   Result Value Ref Range    Potassium 6.3 (HH) 3.5 - 5.3 mmol/L   Blood Gas Arterial Full Panel   Result Value Ref Range    POCT pH, Arterial 7.28 (L) 7.38 - 7.42 pH    POCT pCO2, Arterial 23 (L) 38 - 42 mm Hg    POCT pO2, Arterial 196 (H) 85 - 95 mm Hg    POCT SO2, Arterial 100 94 - 100 %    POCT Oxy Hemoglobin, Arterial 98.3 (H) 94.0 - 98.0 %    POCT Hematocrit Calculated, Arterial 31.0 (L) 41.0 - 52.0 %    POCT Sodium, Arterial 128 (L) 136 - 145 mmol/L    POCT Potassium, Arterial 6.5 (HH) 3.5 - 5.3 mmol/L    POCT Chloride, Arterial 102 98 - 107 mmol/L    POCT Ionized Calcium, Arterial 0.98 (L) 1.10 - 1.33 mmol/L    POCT Glucose, Arterial 161 (H) 74 - 99 mg/dL    POCT Lactate, Arterial 7.2 (HH) 0.4 - 2.0 mmol/L    POCT Base Excess, Arterial -14.3 (L) -2.0 - 3.0 mmol/L    POCT HCO3 Calculated, Arterial 10.8 (L) 22.0 - 26.0 mmol/L    POCT Hemoglobin, Arterial 10.2 (L) 13.5 - 17.5 g/dL    POCT Anion Gap, Arterial 22 10 - 25 mmo/L    Patient Temperature 37.0 degrees Celsius    FiO2 70 %   POCT GLUCOSE   Result Value Ref Range    POCT Glucose 202 (H) 74 - 99 mg/dL   Transthoracic Echo Complete   Result Value Ref Range    AV pk maggie 1.01 m/s    AV mn grad 2 mmHg    LVOT diam 1.99 cm    MV E/A ratio 0.84     LA vol index A/L 26.5 ml/m2    Tricuspid annular plane systolic excursion 1.0 cm    LV EF 25 %    LVIDd 4.31 cm    RVSP 20.7 mmHg    Aortic Valve Area by Continuity of VTI 1.61 cm2    Aortic Valve Area by Continuity of Peak Velocity 1.70 cm2    AV pk grad 4 mmHg    LV A4C EF 28.0    Blood Gas Lactic Acid, Venous   Result Value Ref Range    POCT Lactate, Venous 9.7 () 0.4 - 2.0 mmol/L   Troponin I, High Sensitivity   Result Value Ref Range    Troponin I, High Sensitivity 26,161 () 0 - 20 ng/L   POCT GLUCOSE   Result Value Ref Range     POCT Glucose 198 (H) 74 - 99 mg/dL     This patient has a urinary catheter   Reason for the urinary catheter remaining today? end-of-life care        Assessment & Plan  Hypoxia    89yo male admitted with 2 weeks of progressive shortness of breath and weakness presenting after a fall at home during physical therapy. Found to have bilateral pneumonia, mild JAG on CKD, elevated LFTs and troponin bump 1900->[id number]->[id number]     Plan:  Discussed with sister Ragini who is POA. Discussed ongoing multisystem dysfunction, new heart failure and concern for impending renal failure with dialysis needs. Ragini was adamant that patient would not want to be subjected to prolonged medical care that would ultimately result in survival with reduced quality of life. He would not want to convalesce in a SNF or lose any amount of independence.    Sister Ragini has requested he be made comfortable and we cease any ongoing medical therapies. Comfort care orders have been placed and drips were stopped. Patient placed on nasal cannula for comfort. Morphine available based on RDOS. Patient's  came to bedside for last rites.             I spent 60 minutes in the professional and overall care of this patient.     This critically ill patient continues to be at-risk for clinically significant deterioration / failure due to the above mentioned dysfunctional, unstable organ systems. I have personally identified and managed all complex critical care issues to prevent aforementioned clinical deterioration. Critical care time is spent at bedside and/or the immediate area and has included, but is not limited to, the review of diagnostic tests, labs, radiographs, serial assessments of hemodynamics, respiratory status, ventilatory management, and family updates. Time spent in procedures and teaching are reported separately.      Samantha Johnson MD       [1] oxygen, , inhalation, Continuous - Inhalation  [2]    [3] PRN medications: albuterol, glycopyrrolate,  haloperidol lactate, haloperidol lactate, ketorolac, LORazepam, morphine, oxygen

## 2025-05-03 NOTE — CONSULTS
Infectious Diseases Initial Consultation    Referred by: Samantha Johnson  Physician seeing patient: Lina Vic  Primary MD: Krystyna Shultz DO  Reason For Consult: Group A Strep bacteremia    History Of Present Illness  Nik Washington is a 89 y.o. male with a past medical history of CAD, CKD, HTN, type 2 DM who presented to the ED 5/2 with cough and dyspnea. Patient lives independently. Patient unable to provide detailed history at this time. Was noted to have severe hypoxia and is on airvo in ICU. He is on levophed. Temp 100.4 on presentation. WBC count 15. Cr 3.5. Blood cultures were obtained and now positive for group A Strep. He is on zosyn, azithromycin, and clindamycin. CT chest showed possible pneumonia.      Past Medical History  He has a past medical history of Class 1 obesity without serious comorbidity with body mass index (BMI) of 30.0 to 30.9 in adult (03/08/2024), Elevated prostate specific antigen (PSA) (02/23/2021), Encounter for general adult medical examination without abnormal findings (09/29/2021), Other conditions influencing health status (05/26/2021), Other specified symptoms and signs involving the circulatory and respiratory systems, Personal history of other diseases of the nervous system and sense organs (12/01/2017), Personal history of other drug therapy (09/29/2021), Personal history of other endocrine, nutritional and metabolic disease, and Personal history of other malignant neoplasm of skin.    Surgical History  He has a past surgical history that includes Carpal tunnel release (Bilateral, 01/26/2017); Colonoscopy (02/27/2019); and Cataract extraction (03/07/2017).     Social History  He reports that he quit smoking about 32 years ago. His smoking use included cigarettes. He started smoking about 62 years ago. He has a 30 pack-year smoking history. He has never used smokeless tobacco. He reports that he does not currently use alcohol. He reports that he does not use drugs.   Travel  Screening       Question Response    Do you have any of the following new or worsening symptoms? Cough    In the last 10 days, have you been in contact with someone who was confirmed or suspected to have Coronavirus/COVID-19? No / Unsure    Have you had a COVID-19 viral test in the last 10 days? No    Have you traveled internationally or domestically in the last month? No          Travel History   Travel since 04/03/25    No documented travel since 04/03/25         Family History  Family History[1]    Allergies  Patient has no known allergies.   Immunization History   Administered Date(s) Administered    COVID-19, mRNA, LNP-S, PF, 30 mcg/0.3 mL dose 03/16/2021, 04/06/2021, 10/21/2021    Flu vaccine, quadrivalent, high-dose, preservative free, age 65y+ (FLUZONE) 10/09/2020, 09/12/2023    Flu vaccine, trivalent, preservative free, HIGH-DOSE, age 65y+ (Fluzone) 10/16/2014, 09/25/2015, 10/04/2016, 10/05/2017, 10/23/2018, 09/10/2019, 09/29/2021, 09/22/2022, 09/13/2024    Influenza, Unspecified 10/09/2012, 10/09/2020    Influenza, seasonal, injectable 10/09/2012    Moderna COVID-19 vaccine, 12 years and older (50mcg/0.5mL)(Spikevax) 10/06/2023    Pfizer COVID-19 vaccine, 12 years and older, (30mcg/0.3mL) (Comirnaty) 10/16/2024    Pfizer COVID-19 vaccine, bivalent, age 12 years and older (30 mcg/0.3 mL) 10/21/2022    Pfizer Gray Cap SARS-CoV-2 08/18/2022    Pneumococcal conjugate vaccine, 13-valent (PREVNAR 13) 04/14/2017    Pneumococcal polysaccharide vaccine, 23-valent, age 2 years and older (PNEUMOVAX 23) 09/06/2010    RESPIRATORY SYNCYTIAL VIRUS (RSV), ELIGIBLE PREGNANT PTS, 0.5 ML (ABRYSVO) 10/16/2024    Tdap vaccine, age 7 year and older (BOOSTRIX, ADACEL) 04/25/2017       Physical Exam  Vitals: Reviewed   General: lethargic   Eyes: no scleral icterus  ENT: no pharyngeal erythema   CV: tachycardic   Resp: coarse exp breath sounds, on airvo  Abd: soft, nondistended  Ext: no joint swelling   Skin: no rashes     Range  "of Vitals (last 24 hours)  Heart Rate:  []   Temp:  [36 °C (96.8 °F)-38 °C (100.4 °F)]   Resp:  [17-54]   BP: ()/()   Height:  [162.2 cm (5' 3.86\")]   Weight:  [78 kg (172 lb)-83.3 kg (183 lb 10.3 oz)]   SpO2:  [86 %-100 %]     Relevant Results  Lab Results   Component Value Date    WBC 17.6 (H) 2025    HGB 10.1 (L) 2025    HCT 29.2 (L) 2025    MCV 92 2025     (L) 2025      Results from last 72 hours   Lab Units 25   SODIUM mmol/L  --  131*   POTASSIUM mmol/L 6.3* 6.1*   CHLORIDE mmol/L  --  101   CO2 mmol/L  --  11*   BUN mg/dL  --  58*   CREATININE mg/dL  --  3.51*   GLUCOSE mg/dL  --  165*   CALCIUM mg/dL  --  8.0*   ANION GAP mmol/L  --  25*   EGFR mL/min/1.73m*2  --  16*     Results from last 72 hours   Lab Units 25  1109   ALK PHOS U/L  --   --  21*   BILIRUBIN TOTAL mg/dL  --   --  1.0   PROTEIN TOTAL g/dL  --   --  6.2*   ALT U/L  --   --  76*   AST U/L  --   --  404*   ALBUMIN g/dL 3.3*   < > 4.0    < > = values in this interval not displayed.     Estimated Creatinine Clearance: 13.6 mL/min (A) (by C-G formula based on SCr of 3.51 mg/dL (H)).    Cultures/Micro   blood culture + group A Strep    Imagin/2 CT PE:  No evidence of pulmonary embolism.      Pulmonary edema with small bilateral pleural effusions. [place]  opacities dependently favored to be due to atelectasis.     CT abd/pelvis:  1.  Stable renal masses bilaterally.  2. Mild diverticulosis of the sigmoid colon.  3. Otherwise no acute findings.    Assessment:  Sepsis due to group A Streptococcus bacteremia, present on admission. Source seems somewhat unclear to me. Possibly pneumonia? CT chest findings seem disproportionately mild to degree of hypoxia  JAG on CKD    Plan/Recommendations:  Change IV zosyn to IV unasyn - monitor for diarrhea, rash  Change IV clindamycin to IV linezolid - will cover for toxic shock  Stop " azithromycin  TTE to rule out endocarditis  Repeat blood cultures 5/4    Lina Ho MD  [facility]  Phone: [phone]  Fax: [phone]        [1]   Family History  Problem Relation Name Age of Onset    Arthritis Mother      Other (CARDIAC DISORDER) Mother      Diabetes Mother      Hypertension Mother      Diabetes Father      Diabetes Sister      Diabetes Brother      Diabetes Sibling

## 2025-05-04 VITALS
SYSTOLIC BLOOD PRESSURE: 111 MMHG | HEIGHT: 64 IN | WEIGHT: 183.64 LBS | HEART RATE: 92 BPM | BODY MASS INDEX: 31.35 KG/M2 | DIASTOLIC BLOOD PRESSURE: 57 MMHG | OXYGEN SATURATION: 90 % | RESPIRATION RATE: 28 BRPM | TEMPERATURE: 97.9 F

## 2025-05-04 LAB
BACTERIA BLD AEROBE CULT: ABNORMAL
BACTERIA BLD CULT: ABNORMAL
GRAM STN SPEC: ABNORMAL

## 2025-05-04 PROCEDURE — 99291 CRITICAL CARE FIRST HOUR: CPT | Performed by: SURGERY

## 2025-05-04 PROCEDURE — 2500000005 HC RX 250 GENERAL PHARMACY W/O HCPCS: Performed by: STUDENT IN AN ORGANIZED HEALTH CARE EDUCATION/TRAINING PROGRAM

## 2025-05-04 PROCEDURE — 2500000004 HC RX 250 GENERAL PHARMACY W/ HCPCS (ALT 636 FOR OP/ED): Mod: JZ | Performed by: STUDENT IN AN ORGANIZED HEALTH CARE EDUCATION/TRAINING PROGRAM

## 2025-05-04 RX ADMIN — Medication 2 L/MIN: at 09:39

## 2025-05-04 RX ADMIN — MORPHINE SULFATE 4 MG: 4 INJECTION, SOLUTION INTRAMUSCULAR; INTRAVENOUS at 09:31

## 2025-05-04 RX ADMIN — MORPHINE SULFATE 4 MG: 4 INJECTION, SOLUTION INTRAMUSCULAR; INTRAVENOUS at 15:41

## 2025-05-04 RX ADMIN — MORPHINE SULFATE 4 MG: 4 INJECTION, SOLUTION INTRAMUSCULAR; INTRAVENOUS at 12:04

## 2025-05-04 RX ADMIN — MORPHINE SULFATE 4 MG: 4 INJECTION, SOLUTION INTRAMUSCULAR; INTRAVENOUS at 17:16

## 2025-05-04 ASSESSMENT — RESPIRATORY DISTRESS OBSERVATION SCALE (RDOS)
PARADOXICAL BREATHING PATTERN: 0 - NONE
ACCESSORY MUSCLE RISE IN CLAVICLE DURING INSPIRATION: 0 - NONE
INVOLUNTARY NASAL FLARING: 0 - NONE
GRUNTING AT END OF EXPIRATION: 0 - NONE
RESPIRATORY RATE PER MINUTE: 2 - >30 BREATHS
GRUNTING AT END OF EXPIRATION: 0 - NONE
ACCESSORY MUSCLE RISE IN CLAVICLE DURING INSPIRATION: 0 - NONE
RESPIRATORY RATE PER MINUTE: 1 - 19-30 BREATHS
LOOK OF FEAR: 0 - NONE
ACCESSORY MUSCLE RISE IN CLAVICLE DURING INSPIRATION: 0 - NONE
RESTLESS NONPURPOSEFUL MOVEMENTS: 2 - FREQUENT MOVEMENTS
RESTLESS NONPURPOSEFUL MOVEMENTS: 2 - FREQUENT MOVEMENTS
PARADOXICAL BREATHING PATTERN: 0 - NONE
INVOLUNTARY NASAL FLARING: 0 - NONE
HEART RATE PER MINUTE: 1 - 90-109 BEATS
HEART RATE PER MINUTE: 1 - 90-109 BEATS
RESPIRATORY RATE PER MINUTE: 1 - 19-30 BREATHS
ACCESSORY MUSCLE RISE IN CLAVICLE DURING INSPIRATION: 0 - NONE
RDOS TOTAL SCORE: 4
GRUNTING AT END OF EXPIRATION: 0 - NONE
RDOS TOTAL SCORE: 5
RDOS TOTAL SCORE: 6
LOOK OF FEAR: 0 - NONE
RESTLESS NONPURPOSEFUL MOVEMENTS: 2 - FREQUENT MOVEMENTS
HEART RATE PER MINUTE: 1 - 90-109 BEATS
LOOK OF FEAR: 2 - EYES WIDE OPEN, FACIAL MUSCLES TENSE, BROW FURROWED, MOUTH OPEN
HEART RATE PER MINUTE: 1 - 90-109 BEATS
LOOK OF FEAR: 2 - EYES WIDE OPEN, FACIAL MUSCLES TENSE, BROW FURROWED, MOUTH OPEN
RDOS TOTAL SCORE: 6
INVOLUNTARY NASAL FLARING: 0 - NONE
GRUNTING AT END OF EXPIRATION: 0 - NONE
RESPIRATORY RATE PER MINUTE: 2 - >30 BREATHS
PARADOXICAL BREATHING PATTERN: 0 - NONE
INVOLUNTARY NASAL FLARING: 0 - NONE
RESTLESS NONPURPOSEFUL MOVEMENTS: 1 - OCCASIONAL, SLIGHT MOVEMENTS
PARADOXICAL BREATHING PATTERN: 0 - NONE

## 2025-05-04 ASSESSMENT — COGNITIVE AND FUNCTIONAL STATUS - GENERAL
MOBILITY SCORE: 6
MOVING TO AND FROM BED TO CHAIR: TOTAL
EATING MEALS: TOTAL
PERSONAL GROOMING: TOTAL
STANDING UP FROM CHAIR USING ARMS: TOTAL
TURNING FROM BACK TO SIDE WHILE IN FLAT BAD: TOTAL
HELP NEEDED FOR BATHING: TOTAL
DRESSING REGULAR LOWER BODY CLOTHING: TOTAL
MOVING FROM LYING ON BACK TO SITTING ON SIDE OF FLAT BED WITH BEDRAILS: TOTAL
WALKING IN HOSPITAL ROOM: TOTAL
TOILETING: TOTAL
DAILY ACTIVITIY SCORE: 6
CLIMB 3 TO 5 STEPS WITH RAILING: TOTAL
DRESSING REGULAR UPPER BODY CLOTHING: TOTAL

## 2025-05-04 ASSESSMENT — PAIN - FUNCTIONAL ASSESSMENT
PAIN_FUNCTIONAL_ASSESSMENT: WONG-BAKER FACES
PAIN_FUNCTIONAL_ASSESSMENT: UNABLE TO SELF-REPORT

## 2025-05-04 ASSESSMENT — PAIN SCALES - GENERAL: PAINLEVEL_OUTOF10: 0 - NO PAIN

## 2025-05-04 NOTE — PROGRESS NOTES
05/04/25 1006   Discharge Planning   Assistance Needed sent referral to [facility] as requestd by md Nik HERNANDEZ Felix is a 89 y.o. male on day 2 of admission presenting with Hypoxia.    Yaz Moreno RN

## 2025-05-04 NOTE — NURSING NOTE
Resumed care of pt at this time. Pt in bed resting comfortably with no s/sx of resp.distress or discomfort. POC continues.

## 2025-05-04 NOTE — ED PROCEDURE NOTE
Procedure  Critical Care    Performed by: Jacob Forde MD  Authorized by: Jacob Forde MD    Critical care provider statement:     Critical care time (minutes):  65    Critical care time was exclusive of:  Separately billable procedures and treating other patients and teaching time    Critical care was necessary to treat or prevent imminent or life-threatening deterioration of the following conditions:  Respiratory failure, sepsis, shock and circulatory failure    Critical care was time spent personally by me on the following activities:  Blood draw for specimens, development of treatment plan with patient or surrogate, evaluation of patient's response to treatment, examination of patient, obtaining history from patient or surrogate, ordering and performing treatments and interventions, ordering and review of laboratory studies, ordering and review of radiographic studies, pulse oximetry, re-evaluation of patient's condition and review of old charts               Jacob Forde MD  05/04/25 1002

## 2025-05-04 NOTE — NURSING NOTE
Met with pts Dtr Ragini and Son Donnie via phone, pts CALVIN present at meeting.  Reviewed hospice philosophy and POC.  Family states pt wishes are no aggressive measures, to move to comfort care and to be a DNRCC. They want to respect the pts wishes  At this time the pt meets for TFR to hospice house.  There is a bed available at [facility], family is agreeable with TFR.  Message to Dr Morales and team,  is agreeable with plan and will place DC orders. Please leave all Ivs and Retana in place.  Please medicate prior to transport.  Physicians to  at 430 pm.  Transport packet outside the door.  Any questions please contact   [facility] [phone]  Chely Rodriguez MSN RN CCTC  R Hospice Liaison  [phone]

## 2025-05-04 NOTE — PROGRESS NOTES
ICU PROGRESS NOTE    Patient remains in comfort care with PRN morphine available for respiratory distress. On 2L NC for comfort.    This morning patient awakens to name but is not able to maintain attention. Cannot answer questions. Respirations comfortable at rest but appears tachypnic when awake.    ASSESSMENT:  89yo male admitted with 2 weeks of progressive shortness of breath and weakness presenting after a fall at home during physical therapy. Found to have bilateral pneumonia, mild JAG on CKD, elevated LFTs and troponin bump [id number]    Plan to engage hospice today, will discuss with family. Continue RDOT guided medication administration.

## 2025-05-04 NOTE — DISCHARGE SUMMARY
Discharge Diagnosis  Group A strep pneumonia and bactermia with acute kidney injury    Issues Requiring Follow-Up  none    Test Results Pending At Discharge  Pending Labs       No current pending labs.            Hospital Course   89yo male living independently in good health admitted with 2 weeks of progressive shortness of breath and weakness presenting after a fall at home during physical therapy. Found to have bilateral pneumonia, JAG on CKD, elevated LFTs and troponin bump [id number]->[id number]. Blood cultures returned positive for group A streptococcus. Pt required maximal NIV oxygen support and remained tachypnic in the 40s.  Hospital day 2 the patient had worsening renal function with increased potassium. ECHO showed new heart failure with EF 25%. He became less responsive and there was a discussion with his sister (POA) about his goals of care.      Discussed ongoing multisystem dysfunction, new heart failure and concern for impending renal failure with dialysis needs. Ragini was adamant that patient would not want to be subjected to prolonged medical care that would ultimately result in survival with reduced quality of life. He would not want to convalesce in a SNF or lose any amount of independence. He was made comfort care hospital day 2. Hospital day 3 he remained arousable but not able to maintain focus.  No enteral intake. Ongoing tachypnea. Family agreed for transfer to hospice care. Patient transferred to hospice house.       Pertinent Physical Exam At Time of Discharge  Resting comfortably, mild tachypnea, worse on awakening  Opens eyes to name and will nod head to simple questions  Lungs diminished breath sounds bilat  Abd soft  Ext warm, no edema    Home Medications     Medication List      STOP taking these medications     Accu-Chek Guide test strips; Generic drug: blood sugar diagnostic   acetaminophen 500 mg capsule; Commonly known as: Tylenol   amLODIPine 5 mg tablet; Commonly known as: Norvasc    aspirin 81 mg EC tablet   calcitriol 0.25 mcg capsule; Commonly known as: Rocaltrol   cholecalciferol 25 mcg (1,000 units) capsule; Commonly known as: Vitamin   D-3   glimepiride 1 mg tablet; Commonly known as: AmaryL   lancets misc; Commonly known as: Accu-Chek Fastclix Lancet Drum   simvastatin 40 mg tablet; Commonly known as: Zocor   terazosin 5 mg capsule; Commonly known as: Hytrin     Samantha Johnson MD

## 2025-05-04 NOTE — CARE PLAN
The patient's goals for the shift include      The clinical goals for the shift include wean off Bipap, pressors      Problem: Fall/Injury  Goal: Not fall by end of shift  Outcome: Progressing     Problem: Fall/Injury  Goal: Be free from injury by end of the shift  Outcome: Progressing     Problem: Fall/Injury  Goal: Verbalize understanding of personal risk factors for fall in the hospital  Outcome: Progressing     Problem: Fall/Injury  Goal: Verbalize understanding of risk factor reduction measures to prevent injury from fall in the home  Outcome: Progressing     Problem: Fall/Injury  Goal: Use assistive devices by end of the shift  Outcome: Progressing     Problem: Fall/Injury  Goal: Pace activities to prevent fatigue by end of the shift  Outcome: Progressing     Problem: Pain - Adult  Goal: Verbalizes/displays adequate comfort level or baseline comfort level  Outcome: Progressing     Problem: Safety - Adult  Goal: Free from fall injury  Outcome: Progressing     Problem: Discharge Planning  Goal: Discharge to home or other facility with appropriate resources  Outcome: Progressing     Problem: Chronic Conditions and Co-morbidities  Goal: Patient's chronic conditions and co-morbidity symptoms are monitored and maintained or improved  Outcome: Progressing     Problem: Nutrition  Goal: Nutrient intake appropriate for maintaining nutritional needs  Outcome: Progressing     Problem: Pain  Goal: Takes deep breaths with improved pain control throughout the shift  Outcome: Progressing  Goal: Turns in bed with improved pain control throughout the shift  Outcome: Progressing  Goal: Walks with improved pain control throughout the shift  Outcome: Progressing  Goal: Performs ADL's with improved pain control throughout shift  Outcome: Progressing  Goal: Participates in PT with improved pain control throughout the shift  Outcome: Progressing  Goal: Free from opioid side effects throughout the shift  Outcome: Progressing  Goal:  Free from acute confusion related to pain meds throughout the shift  Outcome: Progressing     Problem: Discharge Planning  Goal: Discharge to home or other facility with appropriate resources  Outcome: Progressing     Problem: Respiratory  Goal: Clear secretions with interventions this shift  Outcome: Progressing  Goal: Minimize anxiety/maximize coping throughout shift  Outcome: Progressing  Goal: Minimal/no exertional discomfort or dyspnea this shift  Outcome: Progressing  Goal: No signs of respiratory distress (eg. Use of accessory muscles. Peds grunting)  Outcome: Progressing  Goal: Patent airway maintained this shift  Outcome: Progressing  Goal: Tolerate mechanical ventilation evidenced by VS/agitation level this shift  Outcome: Progressing  Goal: Tolerate pulmonary toileting this shift  Outcome: Progressing  Goal: Verbalize decreased shortness of breath this shift  Outcome: Progressing  Goal: Wean oxygen to maintain O2 saturation per order/standard this shift  Outcome: Progressing  Goal: Increase self care and/or family involvement in next 24 hours  Outcome: Progressing

## 2025-05-05 LAB
ATRIAL RATE: 62 BPM
P AXIS: 17 DEGREES
P OFFSET: 168 MS
P ONSET: 117 MS
PR INTERVAL: 220 MS
Q ONSET: 227 MS
QRS COUNT: 11 BEATS
QRS DURATION: 62 MS
QT INTERVAL: 426 MS
QTC CALCULATION(BAZETT): 432 MS
QTC FREDERICIA: 430 MS
R AXIS: 35 DEGREES
T AXIS: -19 DEGREES
T OFFSET: 440 MS
VENTRICULAR RATE: 62 BPM

## 2025-05-07 ENCOUNTER — APPOINTMENT (OUTPATIENT)
Dept: PODIATRY | Facility: CLINIC | Age: OVER 89
End: 2025-05-07
Payer: MEDICARE

## 2025-06-16 NOTE — PROGRESS NOTES
"  Subjective  Nik Washington  is a 89 y.o. year old male who presents for HTN, RBBB. No chest pain, no dysp;suni, no palapitionsl no edema.  BP at home 135-140 sysrtolic at home    Blood pressure 160/60, pulse 79, height 1.626 m (5' 4\"), weight 80.8 kg (178 lb 3.2 oz), SpO2 97 %.   Patient has no known allergies.  Past Medical History:   Diagnosis Date    Elevated prostate specific antigen (PSA) 02/23/2021    High prostate specific antigen (PSA)    Encounter for general adult medical examination without abnormal findings 09/29/2021    Medicare annual wellness visit, subsequent    Other conditions influencing health status 05/26/2021    Diabetes mellitus type 2, uncontrolled    Other specified symptoms and signs involving the circulatory and respiratory systems     Abdominal bruit    Personal history of other diseases of the nervous system and sense organs 12/01/2017    History of carpal tunnel syndrome    Personal history of other drug therapy 09/29/2021    History of influenza vaccination    Personal history of other endocrine, nutritional and metabolic disease     History of hyperkalemia    Personal history of other malignant neoplasm of skin     History of malignant neoplasm of skin     Past Surgical History:   Procedure Laterality Date    CARPAL TUNNEL RELEASE Bilateral 01/26/2017    Neuroplasty Decompression Median Nerve At Carpal Tunnel    CATARACT EXTRACTION  03/07/2017    Cataract Surgery    COLONOSCOPY  02/27/2019    Complete Colonoscopy     Family History   Problem Relation Name Age of Onset    Arthritis Mother      Other (CARDIAC DISORDER) Mother      Diabetes Mother      Hypertension Mother      Diabetes Father      Diabetes Sister      Diabetes Brother      Diabetes Sibling       @SOC    Current Outpatient Medications   Medication Sig Dispense Refill    acetaminophen (Tylenol) 500 mg capsule Take 2 capsules (1,000 mg) by mouth every 6 hours if needed.      amLODIPine (Norvasc) 5 mg tablet Take 1 " Pt has stage 3 chronic kidney disease and hypertension.  Just saw RT last week.  Per chart review no different dose than the 250-100 mg. PCP is out of clinic all week.  What does Covering provider think of [facility] suggestion of going up to 400-200 mg?  thanks   tablet (5 mg) by mouth once daily.      ammonium lactate (Amlactin) 12 % cream Apply topically if needed for dry skin. APPLY PER DIRECTED      aspirin 81 mg EC tablet Take 1 tablet (81 mg) by mouth once daily.      cholecalciferol (Vitamin D-3) 25 MCG (1000 UT) capsule Take by mouth.      diclofenac sodium (Voltaren) 1 % gel gel PER DIRECTED TRANSDERMAL      gabapentin (Neurontin) 300 mg capsule Take 1 capsule (300 mg) by mouth once daily. 90 capsule 1    simvastatin (Zocor) 40 mg tablet TAKE ONE TABLET BY MOUTH EVERY DAY 90 tablet 1    terazosin (Hytrin) 5 mg capsule TAKE ONE CAPSULE BY MOUTH EVERY DAY 90 capsule 1     No current facility-administered medications for this visit.        ROS  Review of Systems   All other systems reviewed and are negative.      Physical Exam  Physical Exam  Constitutional:       Appearance: He is obese.   HENT:      Head: Normocephalic and atraumatic.   Eyes:      Extraocular Movements: Extraocular movements intact.      Pupils: Pupils are equal, round, and reactive to light.   Neck:      Comments: Transmitted murmur to carotids bilaerally  Cardiovascular:      Rate and Rhythm: Normal rate and regular rhythm.      Heart sounds: Murmur heard.      Crescendo decrescendo systolic murmur is present with a grade of 3/6.      Comments: S2 widely split  Pulmonary:      Effort: Pulmonary effort is normal.      Breath sounds: Normal breath sounds.   Musculoskeletal:      Right lower leg: No edema.      Left lower leg: No edema.   Skin:     General: Skin is warm and dry.   Neurological:      General: No focal deficit present.      Mental Status: He is alert and oriented to person, place, and time.   Psychiatric:         Mood and Affect: Mood normal.         Behavior: Behavior normal.          EKG  Encounter Date: 03/08/24   ECG 12 Lead    Narrative    Sinus bradycardia at 58/min., RBBB       Problem List Items Addressed This Visit       Bradycardia, sinus    Complete right bundle branch block     Hyperlipidemia    Hypertension    Bradycardia - Primary    Relevant Orders    ECG 12 Lead (Completed)    Class 1 obesity without serious comorbidity with body mass index (BMI) of 30.0 to 30.9 in adult         No follow-ups on file.      Carl Coello MD    numerical 0-10

## 2025-09-05 ENCOUNTER — APPOINTMENT (OUTPATIENT)
Dept: RADIOLOGY | Facility: CLINIC | Age: OVER 89
End: 2025-09-05
Payer: MEDICARE

## 2025-09-12 ENCOUNTER — APPOINTMENT (OUTPATIENT)
Dept: CARDIOLOGY | Facility: CLINIC | Age: OVER 89
End: 2025-09-12
Payer: MEDICARE

## 2025-09-18 ENCOUNTER — APPOINTMENT (OUTPATIENT)
Dept: UROLOGY | Facility: CLINIC | Age: OVER 89
End: 2025-09-18
Payer: MEDICARE

## 2025-09-25 ENCOUNTER — APPOINTMENT (OUTPATIENT)
Dept: PRIMARY CARE | Facility: CLINIC | Age: OVER 89
End: 2025-09-25
Payer: MEDICARE